# Patient Record
Sex: MALE | Race: WHITE | NOT HISPANIC OR LATINO | Employment: OTHER | URBAN - METROPOLITAN AREA
[De-identification: names, ages, dates, MRNs, and addresses within clinical notes are randomized per-mention and may not be internally consistent; named-entity substitution may affect disease eponyms.]

---

## 2018-02-05 ENCOUNTER — OFFICE VISIT (OUTPATIENT)
Dept: FAMILY MEDICINE | Facility: CLINIC | Age: 83
End: 2018-02-05
Payer: COMMERCIAL

## 2018-02-05 VITALS
DIASTOLIC BLOOD PRESSURE: 80 MMHG | TEMPERATURE: 96.7 F | SYSTOLIC BLOOD PRESSURE: 158 MMHG | BODY MASS INDEX: 19.93 KG/M2 | HEIGHT: 66 IN | OXYGEN SATURATION: 98 % | WEIGHT: 124 LBS | HEART RATE: 81 BPM

## 2018-02-05 DIAGNOSIS — M81.0 OSTEOPOROSIS, UNSPECIFIED OSTEOPOROSIS TYPE, UNSPECIFIED PATHOLOGICAL FRACTURE PRESENCE: ICD-10-CM

## 2018-02-05 DIAGNOSIS — Z00.00 ROUTINE GENERAL MEDICAL EXAMINATION AT A HEALTH CARE FACILITY: Primary | ICD-10-CM

## 2018-02-05 LAB
ANION GAP SERPL CALCULATED.3IONS-SCNC: 8 MMOL/L (ref 3–14)
BASOPHILS # BLD AUTO: 0 10E9/L (ref 0–0.2)
BASOPHILS NFR BLD AUTO: 0.3 %
BUN SERPL-MCNC: 21 MG/DL (ref 7–30)
CALCIUM SERPL-MCNC: 9.2 MG/DL (ref 8.5–10.1)
CHLORIDE SERPL-SCNC: 106 MMOL/L (ref 94–109)
CHOLEST SERPL-MCNC: 127 MG/DL
CO2 SERPL-SCNC: 25 MMOL/L (ref 20–32)
CREAT SERPL-MCNC: 0.84 MG/DL (ref 0.66–1.25)
DIFFERENTIAL METHOD BLD: ABNORMAL
EOSINOPHIL # BLD AUTO: 0.1 10E9/L (ref 0–0.7)
EOSINOPHIL NFR BLD AUTO: 2 %
ERYTHROCYTE [DISTWIDTH] IN BLOOD BY AUTOMATED COUNT: 13.1 % (ref 10–15)
GFR SERPL CREATININE-BSD FRML MDRD: 88 ML/MIN/1.7M2
GLUCOSE SERPL-MCNC: 86 MG/DL (ref 70–99)
HCT VFR BLD AUTO: 42.3 % (ref 40–53)
HDLC SERPL-MCNC: 49 MG/DL
HGB BLD-MCNC: 13.7 G/DL (ref 13.3–17.7)
LDLC SERPL CALC-MCNC: 66 MG/DL
LYMPHOCYTES # BLD AUTO: 1 10E9/L (ref 0.8–5.3)
LYMPHOCYTES NFR BLD AUTO: 14.9 %
MCH RBC QN AUTO: 31.3 PG (ref 26.5–33)
MCHC RBC AUTO-ENTMCNC: 32.4 G/DL (ref 31.5–36.5)
MCV RBC AUTO: 97 FL (ref 78–100)
MONOCYTES # BLD AUTO: 0.9 10E9/L (ref 0–1.3)
MONOCYTES NFR BLD AUTO: 13.4 %
NEUTROPHILS # BLD AUTO: 4.6 10E9/L (ref 1.6–8.3)
NEUTROPHILS NFR BLD AUTO: 69.4 %
NONHDLC SERPL-MCNC: 78 MG/DL
PLATELET # BLD AUTO: 225 10E9/L (ref 150–450)
POTASSIUM SERPL-SCNC: 4.3 MMOL/L (ref 3.4–5.3)
PTH-INTACT SERPL-MCNC: 75 PG/ML (ref 12–72)
RBC # BLD AUTO: 4.38 10E12/L (ref 4.4–5.9)
SODIUM SERPL-SCNC: 139 MMOL/L (ref 133–144)
TRIGL SERPL-MCNC: 60 MG/DL
WBC # BLD AUTO: 6.6 10E9/L (ref 4–11)

## 2018-02-05 PROCEDURE — G0439 PPPS, SUBSEQ VISIT: HCPCS | Performed by: FAMILY MEDICINE

## 2018-02-05 PROCEDURE — 80061 LIPID PANEL: CPT | Performed by: FAMILY MEDICINE

## 2018-02-05 PROCEDURE — 99213 OFFICE O/P EST LOW 20 MIN: CPT | Mod: 25 | Performed by: FAMILY MEDICINE

## 2018-02-05 PROCEDURE — 80048 BASIC METABOLIC PNL TOTAL CA: CPT | Performed by: FAMILY MEDICINE

## 2018-02-05 PROCEDURE — 82306 VITAMIN D 25 HYDROXY: CPT | Performed by: FAMILY MEDICINE

## 2018-02-05 PROCEDURE — 83970 ASSAY OF PARATHORMONE: CPT | Performed by: FAMILY MEDICINE

## 2018-02-05 PROCEDURE — 36415 COLL VENOUS BLD VENIPUNCTURE: CPT | Performed by: FAMILY MEDICINE

## 2018-02-05 PROCEDURE — 85025 COMPLETE CBC W/AUTO DIFF WBC: CPT | Performed by: FAMILY MEDICINE

## 2018-02-05 ASSESSMENT — ACTIVITIES OF DAILY LIVING (ADL)
CURRENT_FUNCTION: PREPARING MEALS REQUIRES ASSISTANCE
I_NEED_ASSISTANCE_FOR_THE_FOLLOWING_DAILY_ACTIVITIES:: PREPARING MEALS
I_NEED_ASSISTANCE_FOR_THE_FOLLOWING_DAILY_ACTIVITIES:: SHOPPING
CURRENT_FUNCTION: SHOPPING REQUIRES ASSISTANCE

## 2018-02-05 NOTE — PROGRESS NOTES
"SUBJECTIVE:   Kemar Jamil is a 82 year old male who presents for Preventive Visit.    Are you in the first 12 months of your Medicare coverage?  No    Physical   Annual:     Getting at least 3 servings of Calcium per day::  Yes    Bi-annual eye exam::  Yes    Dental care twice a year::  Yes    Sleep apnea or symptoms of sleep apnea::  None    Diet::  Regular (no restrictions)    Frequency of exercise::  1 day/week    Taking medications regularly::  Not Applicable    Additional concerns today::  No    Ability to successfully perform activities of daily living: shopping requires assistance and preparing meals requires assistance  Home Safety:  Throw rugs in the hallway and lack of grab bars in the bathroom  Hearing Impairment: difficulty following a conversation in a noisy restaurant or crowded room, difficulty following dialogue in the theater, need to ask people to speak up or repeat themselves, find that men's voices are easier to understand than woman's, difficulty understanding soft or whispered speech and difficulty understanding speech on the telephone    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 No signs of recurrence.    Hearing loss - wears hearing aides.    Osteoporosis -   Evaluation and treatment:    He was previously on Fosamax and he stopped it due to \"side effects\" but he can't recall what those were.   We will check PTH, Vitamin D, DEXA and go from there.    Elevated BP - does not check at home.  Evaluation and treatment:    No medications at this time.   I asked him to use his wife's BP machine and check bid for 2 weeks and send those to me.    BP Readings from Last 6 Encounters:   02/05/18 (!) 168/93   02/25/14 132/70   06/19/13 131/81   06/12/13 143/74   05/15/13 133/80   05/08/13 134/86     Preventive - advised to inquire about the new shingles vaccine at our pharmacy.    Immunization History   Administered Date(s) Administered     Influenza Vaccine IM 3yrs+ 4 Valent IIV4 10/09/2017     Pneumo " Conj 13-V (2010&after) 05/17/2015     Pneumococcal 23 valent 11/27/2007, 04/20/2015     TD (ADULT, 7+) 01/03/2003     TDAP Vaccine (Boostrix) 04/24/2013     Zoster vaccine, live 11/27/2007     Fall risk:  Fallen 2 or more times in the past year?: No  Any fall with injury in the past year?: No        Reviewed and updated as needed this visit by clinical staff         Reviewed and updated as needed this visit by Provider        Social History   Substance Use Topics     Smoking status: Never Smoker     Smokeless tobacco: Never Used      Comment: Nonsmoking household     Alcohol use Yes      Comment: occ       Alcohol Use 2/5/2018   If you drink alcohol, do you typically have greater than 3 drinks per day OR greater than 7 drinks per week?   No   No flowsheet data found.          Today's PHQ-2 Score:   PHQ-2 ( 1999 Pfizer) 2/5/2018   Q1: Little interest or pleasure in doing things 0   Q2: Feeling down, depressed or hopeless 0   PHQ-2 Score 0   Q1: Little interest or pleasure in doing things Not at all   Q2: Feeling down, depressed or hopeless Not at all   PHQ-2 Score 0       Do you feel safe in your environment - Yes      Current providers sharing in care for this patient include:   Patient Care Team:  Armando Correa Jr., MD as PCP - General    The following health maintenance items are reviewed in Epic and correct as of today:  Health Maintenance   Topic Date Due     FALL RISK ASSESSMENT  04/24/2014     ADVANCE DIRECTIVE PLANNING Q5 YRS  01/30/2023     TETANUS IMMUNIZATION (SYSTEM ASSIGNED)  04/24/2023     INFLUENZA VACCINE (SYSTEM ASSIGNED)  Completed     PNEUMOCOCCAL  Completed         Review of Systems  C: NEGATIVE for fever, chills, change in weight  I: NEGATIVE for worrisome rashes, moles or lesions  E: NEGATIVE for vision changes or irritation  E/M: NEGATIVE for ear, mouth and throat problems  R: NEGATIVE for significant cough or SOB  B: NEGATIVE for masses, tenderness or discharge  CV: NEGATIVE for chest  "pain, palpitations or peripheral edema  GI: NEGATIVE for nausea, abdominal pain, heartburn, or change in bowel habits  : NEGATIVE for frequency, dysuria, or hematuria  M: NEGATIVE for significant arthralgias or myalgia  N: NEGATIVE for weakness, dizziness or paresthesias  E: NEGATIVE for temperature intolerance, skin/hair changes  H: NEGATIVE for bleeding problems  P: NEGATIVE for changes in mood or affect    OBJECTIVE:   BP (!) 168/93 (Cuff Size: Adult Regular)  Pulse 81  Temp 96.7  F (35.9  C) (Oral)  Ht 5' 5.5\" (1.664 m)  Wt 124 lb (56.2 kg)  SpO2 98%  BMI 20.32 kg/m2 Estimated body mass index is 20.32 kg/(m^2) as calculated from the following:    Height as of this encounter: 5' 5.5\" (1.664 m).    Weight as of this encounter: 124 lb (56.2 kg).  Physical Exam  GENERAL: healthy, alert and no distress  EYES: Eyes grossly normal to inspection, PERRL and conjunctivae and sclerae normal  HENT: ear canals and TM's normal, nose and mouth without ulcers or lesions  NECK: no adenopathy, no asymmetry, masses, or scars and thyroid normal to palpation  RESP: lungs clear to auscultation - no rales, rhonchi or wheezes  CV: regular rate and rhythm, normal S1 S2, no S3 or S4, no murmur, click or rub, no peripheral edema and peripheral pulses strong  ABDOMEN: soft, nontender, no hepatosplenomegaly, no masses and bowel sounds normal   (male): normal male genitalia without lesions or urethral discharge, no hernia  MS: no gross musculoskeletal defects noted, no edema  SKIN: no suspicious lesions or rashes  NEURO: Normal strength and tone, mentation intact and speech normal  PSYCH: mentation appears normal, affect normal/bright    ASSESSMENT / PLAN:       End of Life Planning:  Patient currently has an advanced directive: No.  I have verified the patient's ablity to prepare an advanced directive/make health care decisions.  Literature was provided to assist patient in preparing an advanced directive.    COUNSELING:  Reviewed " "preventive health counseling, as reflected in patient instructions       Regular exercise       Healthy diet/nutrition      Estimated body mass index is 21.46 kg/(m^2) as calculated from the following:    Height as of 2/25/14: 5' 7\" (1.702 m).    Weight as of 2/25/14: 137 lb (62.1 kg).     reports that he has never smoked. He has never used smokeless tobacco.      Appropriate preventive services were discussed with this patient, including applicable screening as appropriate for cardiovascular disease, diabetes, osteopenia/osteoporosis, and glaucoma.  As appropriate for age/gender, discussed screening for colorectal cancer, prostate cancer, breast cancer, and cervical cancer. Checklist reviewing preventive services available has been given to the patient.    Reviewed patients plan of care and provided an AVS. The Basic Care Plan (routine screening as documented in Health Maintenance) for Kemar meets the Care Plan requirement. This Care Plan has been established and reviewed with the Patient.    Assessment and Plan - Decision Making    1. Routine general medical examination at a health care facility    Results of today's exam given to patient verbally along with age appropriate preventive measures. Written instructions reviewed and handed to the patient.    - CBC with platelets differential  - Basic metabolic panel  - Lipid panel reflex to direct LDL Fasting    2. Osteoporosis, unspecified osteoporosis type, unspecified pathological fracture presence  Per HPI  - Parathyroid Hormone Intact  - Vitamin D Deficiency  - DX Hip/Pelvis/Spine; Future      Written instructions given as follows:    Patient Instructions   1. Use your wife's blood pressure machine and check 2 times per day for 2 weeks and call in those results to me.  2. Set up bone density test - [phone].    3. I will contact you about your result along with further instructions.  4. Stop by our pharmacy and inquire about the new Shingles vaccine.      "

## 2018-02-05 NOTE — MR AVS SNAPSHOT
After Visit Summary   2/5/2018    Kemar Jamil    MRN: [id number]           Patient Information     Date Of Birth          1935        Visit Information        Provider Department      2/5/2018 9:30 AM Noe Andrew MD [facility]        Today's Diagnoses     Routine general medical examination at a health care facility    -  1    Osteoporosis, unspecified osteoporosis type, unspecified pathological fracture presence          Care Instructions    1. Use your wife's blood pressure machine and check 2 times per day for 2 weeks and call in those results to me.  2. Set up bone density test - [phone].    3. I will contact you about your result along with further instructions.  4. Stop by our pharmacy and inquire about the new Shingles vaccine.          Follow-ups after your visit        Future tests that were ordered for you today     Open Future Orders        Priority Expected Expires Ordered    DX Hip/Pelvis/Spine Routine  2/5/2019 2/5/2018            Who to contact     If you have questions or need follow up information about today's clinic visit or your schedule please contact [facility] directly at [phone].  Normal or non-critical lab and imaging results will be communicated to you by [email or web address], letter or phone within 4 business days after the clinic has received the results. If you do not hear from us within 7 days, please contact the clinic through [email or web address] or phone. If you have a critical or abnormal lab result, we will notify you by phone as soon as possible.  Submit refill requests through [employer] or call your pharmacy and they will forward the refill request to us. Please allow 3 business days for your refill to be completed.          Additional Information About Your Visit        [email or web address] Information     [employer] lets you send messages to your doctor, view your test results, renew your prescriptions, schedule appointments and more. To sign up, go to  [email or web address] . Click on \"Log in\" on the left side of the screen, which will take you to the Welcome page. Then click on \"Sign up Now\" on the right side of the page.     You will be asked to enter the access code listed below, as well as some personal information. Please follow the directions to create your username and password.     Your access code is: [email or web address]  Expires: 2018 10:00 AM     Your access code will  in 90 days. If you need help or a new code, please call your [place] clinic or [phone].        Care EveryWhere ID     This is your Care EveryWhere ID. This could be used by other organizations to access your [facility] medical records  [id number]        Your Vitals Were     Pulse Temperature Height Pulse Oximetry BMI (Body Mass Index)       81 96.7  F (35.9  C) (Oral) 5' 5.5\" (1.664 m) 98% 20.32 kg/m2        Blood Pressure from Last 3 Encounters:   18 158/80   14 132/70   13 131/81    Weight from Last 3 Encounters:   18 124 lb (56.2 kg)   14 137 lb (62.1 kg)   13 139 lb 12.8 oz (63.4 kg)              We Performed the Following     Basic metabolic panel     CBC with platelets differential     Lipid panel reflex to direct LDL Fasting     Parathyroid Hormone Intact     Vitamin D Deficiency        Primary Care Provider Office Phone # Fax #    Armando Correa Jr., [phone] [address]  [place] [address]        Equal Access to Services     [facility] : Hadii roderick Gutiérrez, wasivliada thalia, qaybta sampsonalcuba snow. So [facility] [phone].    ATENCIÓN: Si habla español, tiene a bolaños disposición servicios gratuitos de asistencia lingüística. Fadi al [phone].    We comply with applicable federal civil rights laws and [place] laws. We do not discriminate on the basis of race, color, national origin, age, disability, sex, sexual orientation, or gender " identity.            Thank you!     Thank you for choosing [facility] [place]  [facility]. Our goal is always to provide you with excellent care. Hearing back from our patients is one way we can continue to improve our services. Please take a few minutes to complete the written survey that you may receive in the mail after your visit with us. Thank you!             Your Updated Medication List - Protect others around you: Learn how to safely use, store and throw away your medicines at www.disposemymeds.org.          This list is accurate as of 2/5/18 10:00 AM.  Always use your most recent med list.                   Brand Name Dispense Instructions for use Diagnosis    ADONAY-MAG ZINC II PO      1 tablet ever other day        Calcium-Vitamin D 600-200 MG-UNIT Tabs      1 TABLET DAILY        fish oil-omega-3 fatty acids 1000 MG capsule      Take 2 g by mouth daily.        GLUCOSAMINE CHONDR 1500 COMPLX PO      1 daily        Lysine 500 MG Caps      1 daily        VITAMIN C PO      1daily

## 2018-02-05 NOTE — PATIENT INSTRUCTIONS
1. Use your wife's blood pressure machine and check 2 times per day for 2 weeks and call in those results to me.  2. Set up bone density test - [phone].    3. I will contact you about your result along with further instructions.  4. Stop by our pharmacy and inquire about the new Shingles vaccine.

## 2018-02-05 NOTE — NURSING NOTE
"Chief Complaint   Patient presents with     Physical       Initial BP (!) 168/93 (Cuff Size: Adult Regular)  Pulse 81  Temp 96.7  F (35.9  C) (Oral)  Ht 5' 5.5\" (1.664 m)  Wt 124 lb (56.2 kg)  SpO2 98%  BMI 20.32 kg/m2 Estimated body mass index is 20.32 kg/(m^2) as calculated from the following:    Height as of this encounter: 5' 5.5\" (1.664 m).    Weight as of this encounter: 124 lb (56.2 kg).  Medication Reconciliation: complete    Gabriela Payton LPN    "

## 2018-02-06 ENCOUNTER — TELEPHONE (OUTPATIENT)
Dept: FAMILY MEDICINE | Facility: CLINIC | Age: 83
End: 2018-02-06

## 2018-02-06 LAB — DEPRECATED CALCIDIOL+CALCIFEROL SERPL-MC: 23 UG/L (ref 20–75)

## 2018-02-07 ENCOUNTER — RADIANT APPOINTMENT (OUTPATIENT)
Dept: BONE DENSITY | Facility: CLINIC | Age: 83
End: 2018-02-07
Attending: FAMILY MEDICINE
Payer: COMMERCIAL

## 2018-02-07 DIAGNOSIS — M81.0 OSTEOPOROSIS, UNSPECIFIED OSTEOPOROSIS TYPE, UNSPECIFIED PATHOLOGICAL FRACTURE PRESENCE: ICD-10-CM

## 2018-02-07 PROCEDURE — 77085 DXA BONE DENSITY AXL VRT FX: CPT | Performed by: INTERNAL MEDICINE

## 2018-02-07 NOTE — PROGRESS NOTES
We will have him take Vitamin D 2000 units daily and see if the PTH will be suppressed. Recheck in 2 months.    All other labs are fine.    Noe Andrew M.D.

## 2018-02-07 NOTE — TELEPHONE ENCOUNTER
Pt requests information be given to his wife.  Wife notified of provider message as written.  Conchita Baxter RN

## 2018-02-07 NOTE — TELEPHONE ENCOUNTER
Please call him:     1. The PTH (parathyroid hormone was a bit high.) Your vitamin D is on the low end of normal. Please take over the counter Vitamin D 3, 2000 units per day.     2. Make a lab appointment in 2 months to recheck the vitamin D and PTH.     3. I will contact you again once I see the DEXA result as well.    Noe Andrwe M.D.

## 2018-02-13 ENCOUNTER — TELEPHONE (OUTPATIENT)
Dept: FAMILY MEDICINE | Facility: CLINIC | Age: 83
End: 2018-02-13

## 2018-02-13 DIAGNOSIS — M81.0 OSTEOPOROSIS, UNSPECIFIED OSTEOPOROSIS TYPE, UNSPECIFIED PATHOLOGICAL FRACTURE PRESENCE: Primary | ICD-10-CM

## 2018-02-13 NOTE — TELEPHONE ENCOUNTER
Patient gave verbal ok to speak with wife.  Wife informed of provider note as below.  Wife verbalized understanding.    Daysi SAVAGEN, RN, CPN

## 2018-02-19 ENCOUNTER — TELEPHONE (OUTPATIENT)
Dept: FAMILY MEDICINE | Facility: CLINIC | Age: 83
End: 2018-02-19

## 2018-02-19 DIAGNOSIS — M81.0 OSTEOPOROSIS, UNSPECIFIED OSTEOPOROSIS TYPE, UNSPECIFIED PATHOLOGICAL FRACTURE PRESENCE: Primary | ICD-10-CM

## 2018-02-19 NOTE — TELEPHONE ENCOUNTER
Twice a day readings at different times over last two weeks.  List starts from date two weeks ago and ends with yesterday.    117/65    131/74  123/70     125/70  134/69   134/63  129/72   121/57  129/70    149/70  144/69    113/62  119/61    130/70  130/66    98/46  124/59    125/67  129/66    117/63  121/56    155/47  126/70   125/80  126/74   128/73  135/57   123/70       Conchita Baxter RN

## 2018-02-19 NOTE — TELEPHONE ENCOUNTER
Pt wife notified of provider message as written.  Pt wife verbalized good understanding.  Wife asking about lab appointment due in 2 months.  Lab appointment made for before endocrinology appointment.  Conchita Baxter RN

## 2018-02-19 NOTE — TELEPHONE ENCOUNTER
Spouse calling on behalf of patient. They want to report Erling's BP readings from the past 2 weeks.

## 2018-04-04 ENCOUNTER — OFFICE VISIT (OUTPATIENT)
Dept: ENDOCRINOLOGY | Facility: CLINIC | Age: 83
End: 2018-04-04
Attending: FAMILY MEDICINE
Payer: COMMERCIAL

## 2018-04-04 VITALS
BODY MASS INDEX: 20.57 KG/M2 | SYSTOLIC BLOOD PRESSURE: 122 MMHG | WEIGHT: 128 LBS | DIASTOLIC BLOOD PRESSURE: 75 MMHG | HEIGHT: 66 IN | HEART RATE: 56 BPM

## 2018-04-04 DIAGNOSIS — M81.0 AGE-RELATED OSTEOPOROSIS WITHOUT CURRENT PATHOLOGICAL FRACTURE: Primary | ICD-10-CM

## 2018-04-04 DIAGNOSIS — M81.0 OSTEOPOROSIS, UNSPECIFIED OSTEOPOROSIS TYPE, UNSPECIFIED PATHOLOGICAL FRACTURE PRESENCE: ICD-10-CM

## 2018-04-04 LAB — PTH-INTACT SERPL-MCNC: 46 PG/ML (ref 18–80)

## 2018-04-04 PROCEDURE — 99204 OFFICE O/P NEW MOD 45 MIN: CPT | Performed by: INTERNAL MEDICINE

## 2018-04-04 PROCEDURE — 83970 ASSAY OF PARATHORMONE: CPT | Performed by: FAMILY MEDICINE

## 2018-04-04 PROCEDURE — 36415 COLL VENOUS BLD VENIPUNCTURE: CPT | Performed by: FAMILY MEDICINE

## 2018-04-04 PROCEDURE — 82306 VITAMIN D 25 HYDROXY: CPT | Performed by: FAMILY MEDICINE

## 2018-04-04 RX ORDER — ALENDRONATE SODIUM 70 MG/1
TABLET ORAL
Qty: 12 TABLET | Refills: 3 | Status: SHIPPED | OUTPATIENT
Start: 2018-04-04 | End: 2019-03-07

## 2018-04-04 NOTE — NURSING NOTE
"Chief Complaint   Patient presents with     Consult     Osteoporosis       Initial /75 (BP Location: Right arm, Cuff Size: Adult Regular)  Pulse 56  Ht 5' 6\" (1.676 m)  Wt 128 lb (58.1 kg)  BMI 20.66 kg/m2 Estimated body mass index is 20.66 kg/(m^2) as calculated from the following:    Height as of this encounter: 5' 6\" (1.676 m).    Weight as of this encounter: 128 lb (58.1 kg).  Medication Reconciliation: complete         Letty Contreras      "

## 2018-04-04 NOTE — PROGRESS NOTES
Name: Kemar Jamil is a 82 year old man, seen at the request of Dr. Noe Andrew for evaluation of osteoporosis     Chief Complaint   Patient presents with     Consult     Osteoporosis     HPI:  Recent issues:  Here with his wife for his osteoporosis evaluation.        Patient recalls diagnosis of osteoporosis approx age 72  Has had several DEXA scans in the past, including '06, '07, '09, '11, '12, 2/2018  Took Fosamax weekly (for 2 yrs?) until 2014, though details not available  He wonders if he may have taken another osteoporosis med, such as Actonel  Available records indicate scans:  4/14/06 DEXA:    L2-4 T -2.3   Left femur neck T -2.3  Scans 4/2007- 3/2012:   L2-4 T scores range T -1.5 to -2.3   Left hip femur T scores range -2.3 to -2.5  2/7/18 DEXA:   L1-3 T -2.2, marked degen changes L4   Left femur neck T -2.6 and right fem neck T -2.5  Previous bone fractures:  Leg age 5, also CHI and skull fracture age 17  No history of kidney stones or chronic steroid med use     Sees Dr. MONALISA Andrew for general medicine evaluations.    PMH/PSH:  Past Medical History:   Diagnosis Date     Decreased hearing      Macular degeneration     legally blind     Osteoporosis      Skull fracture (H)     ORIF     Past Surgical History:   Procedure Laterality Date     ENT SURGERY  5-8-13    Removal- Left Pinna     ENT SURGERY  6-12-13    Re-Excision- Left Pinna     EYE SURGERY      cataract     HC TOOTH EXTRACTION W/FORCEP       TONSILLECTOMY         Family Hx:  Family History   Problem Relation Age of Onset     Asthma Sister      CANCER Mother      uterine     DIABETES Mother      C.A.D. Maternal Grandfather      HEART DISEASE Father      MIs, CHF     CANCER Brother      esophageal         Social Hx:  Social History     Social History     Marital status:      Spouse name: N/A     Number of children: N/A     Years of education: N/A     Occupational History     Not on file.     Social History Main Topics      "Smoking status: Never Smoker     Smokeless tobacco: Never Used      Comment: Nonsmoking household     Alcohol use Yes      Comment: occ     Drug use: No     Sexual activity: Yes     Partners: Female     Other Topics Concern     Parent/Sibling W/ Cabg, Mi Or Angioplasty Before 65f 55m? No     Social History Narrative          MEDICATIONS:  has a current medication list which includes the following prescription(s): alendronate, fish oil-omega-3 fatty acids, ascorbic acid, calcium-vitamin d, lysine, multiple minerals-vitamins, and glucosamine-chondroit-vit c-mn.    ROS:     ROS: 10 point ROS neg other than the symptoms noted above in the HPI.    GENERAL: some fatigue; no weight changes; denies fevers, chills, night sweats.   HEENT: poor vision (MD), reduced hearing; no dysphagia, odonophagia, diplopia, neck pain  THYROID:  no apparent hyper or hypothyroid symptoms  CV: no chest pain, pressure, palpitations  LUNGS: no SOB, L[place], cough, wheezing   ABDOMEN: occasional heartburn; no diarrhea, constipation, abdominal pain  EXTREMITIES: no rashes, ulcers, edema  NEUROLOGY: no headaches, denies changes in vision, tingling, extremitiy numbness   MSK: mild leg weakness; denies back pain or arthralgias; no muscle aches or pains  SKIN: no rashes or lesions  : some nocturia  PSYCH:  stable mood, no significant anxiety or depression  ENDOCRINE: no heat or cold intolerance    Physical Exam   VS: /75 (BP Location: Right arm, Cuff Size: Adult Regular)  Pulse 56  Ht 5' 6\" (1.676 m)  Wt 128 lb (58.1 kg)  BMI 20.66 kg/m2  GENERAL: AXOX3, NAD, slender, well dressed, answering questions appropriately, appears stated age.  THYROID:  normal gland, no apparent nodules or goiter  HEENT: difficulty with hearing and vision, wearing hearing aids; neck non-tender, EOMI  CV: RRR, no rubs, gallops, no murmurs  LUNGS: CTAB, no wheezes, rales, or ronchi  ABDOMEN: soft, nontender, nondistended  EXTREMITIES: no pedal edema  NEUROLOGY: CN " grossly intact, + DTR lower extremity, no tremors  MSK: mildly kyphotic posture, back non-tender to palpation    LABS:    All pertinent notes, labs, and images personally reviewed by me.     A/P:  Encounter Diagnosis   Name Primary?     Age-related osteoporosis without current pathological fracture Yes       Comments:  Reviewed health history and osteoporosis issues.  He had limited treatment course of Fosamax in the past.    Plan:  Discussed general issues with the mild osteoporosis diagnosis and management  Reviewed concept of using the DEXA scan imaging to assess bone mineral density (BMD)  We reviewed treatment options with oral bisphosphonate, also the IV Boniva vs Reclast med options.    Recommend:  Advised restarting alendronate 70 mg po Q week   Discussed potential risks/benefits, dosing, Rx issues... He agreed.  New Rx sent to his [facility] pharmacy  Monitor for symptom changes  Plan to have the lab tests today, already ordered by Dr. Andrew  Continue calcium and vit D supplement use  Avoid heavy lifting and fall injuries  Hope for lower osteoporotic fracture risk, mild improvement in BMD, med tolerability  Recheck DEXA 4/2020 for comparison.  See back in 3-mo for f/u and discussion    Addressed patient questions today    Labs ordered today: No orders of the defined types were placed in this encounter.    Radiology/Consults ordered today: None    More than 50% of the time spent with Mr. Jamil on counseling / coordinating his care.  Total appointment time was 30 minutes.    Follow-up:  3 mo    Tyler Barrera MD  Endocrinology  [facility]  CC: MONALISA Andrew

## 2018-04-04 NOTE — LETTER
4/4/2018         RE: Kemar Jamil  [address] [facility] [address]        Dear Colleague,    Thank you for referring your patient, Kemar Jamil, to the [facility]. Please see a copy of my visit note below.    Name: Kemar Jamil is a 82 year old man, seen at the request of Dr. Noe Andrew for evaluation of osteoporosis     Chief Complaint   Patient presents with     Consult     Osteoporosis     HPI:  Recent issues:  Here with his wife for his osteoporosis evaluation.        Patient recalls diagnosis of osteoporosis approx age 72  Has had several DEXA scans in the past, including '06, '07, '09, '11, '12, 2/2018  Took Fosamax weekly (for 2 yrs?) until 2014, though details not available  He wonders if he may have taken another osteoporosis med, such as Actonel  Available records indicate scans:  4/14/06 DEXA:    L2-4 T -2.3   Left femur neck T -2.3  Scans 4/2007- 3/2012:   L2-4 T scores range T -1.5 to -2.3   Left hip femur T scores range -2.3 to -2.5  2/7/18 DEXA:   L1-3 T -2.2, marked degen changes L4   Left femur neck T -2.6 and right fem neck T -2.5  Previous bone fractures:  Leg age 5, also CHI and skull fracture age 17  No history of kidney stones or chronic steroid med use     Sees Dr. MONALISA Andrew for general medicine evaluations.    PMH/PSH:  Past Medical History:   Diagnosis Date     Decreased hearing      Macular degeneration     legally blind     Osteoporosis      Skull fracture (H)     ORIF     Past Surgical History:   Procedure Laterality Date     ENT SURGERY  5-8-13    Removal- Left Pinna     ENT SURGERY  6-12-13    Re-Excision- Left Pinna     EYE SURGERY      cataract     HC TOOTH EXTRACTION W/FORCEP       TONSILLECTOMY         Family Hx:  Family History   Problem Relation Age of Onset     Asthma Sister      CANCER Mother      uterine     DIABETES Mother      C.A.D. Maternal Grandfather      HEART DISEASE Father      MIs, CHF     CANCER Brother      " esophageal         Social Hx:  Social History     Social History     Marital status:      Spouse name: N/A     Number of children: N/A     Years of education: N/A     Occupational History     Not on file.     Social History Main Topics     Smoking status: Never Smoker     Smokeless tobacco: Never Used      Comment: Nonsmoking household     Alcohol use Yes      Comment: occ     Drug use: No     Sexual activity: Yes     Partners: Female     Other Topics Concern     Parent/Sibling W/ Cabg, Mi Or Angioplasty Before 65f 55m? No     Social History Narrative          MEDICATIONS:  has a current medication list which includes the following prescription(s): alendronate, fish oil-omega-3 fatty acids, ascorbic acid, calcium-vitamin d, lysine, multiple minerals-vitamins, and glucosamine-chondroit-vit c-mn.    ROS:     ROS: 10 point ROS neg other than the symptoms noted above in the HPI.    GENERAL: some fatigue; no weight changes; denies fevers, chills, night sweats.   HEENT: poor vision (MD), reduced hearing; no dysphagia, odonophagia, diplopia, neck pain  THYROID:  no apparent hyper or hypothyroid symptoms  CV: no chest pain, pressure, palpitations  LUNGS: no SOB, LAGU[place], cough, wheezing   ABDOMEN: occasional heartburn; no diarrhea, constipation, abdominal pain  EXTREMITIES: no rashes, ulcers, edema  NEUROLOGY: no headaches, denies changes in vision, tingling, extremitiy numbness   MSK: mild leg weakness; denies back pain or arthralgias; no muscle aches or pains  SKIN: no rashes or lesions  : some nocturia  PSYCH:  stable mood, no significant anxiety or depression  ENDOCRINE: no heat or cold intolerance    Physical Exam   VS: /75 (BP Location: Right arm, Cuff Size: Adult Regular)  Pulse 56  Ht 5' 6\" (1.676 m)  Wt 128 lb (58.1 kg)  BMI 20.66 kg/m2  GENERAL: AXOX3, NAD, slender, well dressed, answering questions appropriately, appears stated age.  THYROID:  normal gland, no apparent nodules or goiter  HEENT: " difficulty with hearing and vision, wearing hearing aids; neck non-tender, EOMI  CV: RRR, no rubs, gallops, no murmurs  LUNGS: CTAB, no wheezes, rales, or ronchi  ABDOMEN: soft, nontender, nondistended  EXTREMITIES: no pedal edema  NEUROLOGY: CN grossly intact, + DTR lower extremity, no tremors  MSK: mildly kyphotic posture, back non-tender to palpation    LABS:    All pertinent notes, labs, and images personally reviewed by me.     A/P:  Encounter Diagnosis   Name Primary?     Age-related osteoporosis without current pathological fracture Yes       Comments:  Reviewed health history and osteoporosis issues.  He had limited treatment course of Fosamax in the past.    Plan:  Discussed general issues with the mild osteoporosis diagnosis and management  Reviewed concept of using the DEXA scan imaging to assess bone mineral density (BMD)  We reviewed treatment options with oral bisphosphonate, also the IV Boniva vs Reclast med options.    Recommend:  Advised restarting alendronate 70 mg po Q week   Discussed potential risks/benefits, dosing, Rx issues... He agreed.  New Rx sent to his [facility] pharmacy  Monitor for symptom changes  Plan to have the lab tests today, already ordered by Dr. Andrew  Continue calcium and vit D supplement use  Avoid heavy lifting and fall injuries  Hope for lower osteoporotic fracture risk, mild improvement in BMD, med tolerability  Recheck DEXA 4/2020 for comparison.  See back in 3-mo for f/u and discussion    Addressed patient questions today    Labs ordered today: No orders of the defined types were placed in this encounter.    Radiology/Consults ordered today: None    More than 50% of the time spent with Mr. Jamil on counseling / coordinating his care.  Total appointment time was 30 minutes.    Follow-up:  3 mo    Tyler Barrera MD  Endocrinology  [facility]  CC: MONALISA Andrew      Again, thank you for allowing me to participate in the care of your patient.         Sincerely,        Tyler Barrera MD

## 2018-04-05 LAB — DEPRECATED CALCIDIOL+CALCIFEROL SERPL-MC: 29 UG/L (ref 20–75)

## 2018-04-17 ENCOUNTER — TELEPHONE (OUTPATIENT)
Dept: FAMILY MEDICINE | Facility: CLINIC | Age: 83
End: 2018-04-17

## 2018-04-17 NOTE — TELEPHONE ENCOUNTER
Patient asked me to speak with his wife regarding these questions. She states her  is very worried about this new diagnosis of osteoporosis and the concerns that he needed to see a specialist. Wife says she was not with her  when he saw Dr Andrew and he is hard of hearing and she thinks he may not of heard all of discussion. She would like to speak with provider if possible to help explain the connection of the vitamin D and parathyroid . She would also like to know bone density results. Also is there anything else he can do to help this besides take the vitamin D. Xin Javed RN

## 2018-04-17 NOTE — TELEPHONE ENCOUNTER
Reason for Call:  Other Lab results    Detailed comments: Patient would like more information regarding lab results with an explanation of the meaning. Either by phone or a detailed letter.    Phone Number Patient can be reached at: Home number on file:     [phone]       Best Time: ASAP    Can we leave a detailed message on this number? YES    Call taken on 4/17/2018 at 4:13 PM by Dolly Xiong

## 2018-04-18 ENCOUNTER — TELEPHONE (OUTPATIENT)
Dept: FAMILY MEDICINE | Facility: CLINIC | Age: 83
End: 2018-04-18

## 2018-04-18 NOTE — TELEPHONE ENCOUNTER
Please call wife:     1. The dx of osteoporosis was made many years ago. I was merely following up on it by ordering DEXA and other tests.     2. Low Vitamin D can cause elevation in PTH (Parathyroid Hormone).      3. Kemar's latest Vitamin D and PTH levels are normal.     4. Further questions are best addressed with the endocrinologist who saw Kemar on 4/4/18 and advised 3 month follow-up.    Noe Andrew M.D.

## 2018-04-18 NOTE — TELEPHONE ENCOUNTER
Pt wife notified of provider message as written.  Pt wife states the endocrinologist just asked pt a lot of questions and then said he could take the medicine.    Pt wife states her main concern is with pt's weight loss.  He used to be 190 lbs, and was 170 lbs for a long time.  He has consistently lost 5 pounds a year without trying to loss weight and is now 120 lbs.      Wife states pt has a good appetite and eats a lot.  He is active and mows the lawn, shovels and walks a lot.    Pt is healthy and does not get sick a lot.  He will not drink anything like an ensure.     Wife asking if his thyroid should be checked or what else should they be doing to find out why he is losing weight?    Conchita Baxter RN

## 2018-04-19 NOTE — TELEPHONE ENCOUNTER
Pt notified of provider message as written. He would like me to give wife message also.  She will be home after 4 pm today or before 7:30 am tomorrow.    Conchita Baxter RN

## 2018-04-19 NOTE — TELEPHONE ENCOUNTER
Please call patient's wife back:     1. We should evaluate the weight loss in clinic.   2. Please come with him to that clinic visit - we can check thyroid and other tests.    Noe Andrew M.D.      Wt Readings from Last 5 Encounters:   04/04/18 128 lb (58.1 kg)   02/05/18 124 lb (56.2 kg)   02/25/14 137 lb (62.1 kg)   06/19/13 139 lb 12.8 oz (63.4 kg)   06/12/13 142 lb 9.6 oz (64.7 kg)

## 2018-04-23 NOTE — PROGRESS NOTES
HPI:    Kemar is an 82 year old male here to discuss:    Weight loss - he is here on the insistence of his wife who is worried about his weight loss. But he is not worried about it.    First noticed: his wife tells me he has been losing weight over the last 20 years.  How much weight lost: his wife says he used to weigh 190#.  Intentional: no  Appetite/caloric intake: he and his wife both tell me that he eats well, has good appetite, not a picky eater.  Depression or stress: denies  Medications: nothing that would promote weight loss.  Intestinal issues: No abd pain, nausea, vomiting, diarrhea, blood in stool or black stools  Infectious: No fevers, chills or night sweats.  Neoplastic: Denies any lymph node enlargement. No skin or mucous membrane ulcers.  Inflammatory: Denies muscle pain, rashes, joint or bone pain.  Recreational chemicals: denies alcohol, tobacco, drugs.  Evaluation and treatment:    It seems his wife if concerned and not Kemar.   I looked back to 2009 and showed his wife that his weight was in the 130's at that time, then up to 140's and now upper 120's.   I considered the diff dx which can include inadequate calorie intake, malabsorption, medication effects, malignancy, metabolic.   We will start with CMP, CBC, ESR, TSH, U/A, SPEP and prealbumin and go from there.   I asked to see him again in 2 months for a weight check.    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 No signs of recurrence.    Hearing loss - wears hearing aides.    Osteoporosis and vit D deficiency -   Evaluation and treatment:   PTH was high but normalized after Vit D replacement - 2000 units daily.   Fosamax   Follows with endocrine    Elevated BP - does not check at home.  Evaluation and treatment:    No medications at this time.   I asked him  to use his wife's BP machine and check bid for 2 weeks and send those to me.    BP Readings from Last 6 Encounters:   04/25/18 133/75   04/04/18 122/75   02/05/18 158/80   02/25/14 132/70   06/19/13 131/81   06/12/13 143/74     Preventive - advised to inquire about the new shingles vaccine at our pharmacy but his wife tells me the cost was too much    Immunization History   Administered Date(s) Administered     Influenza Vaccine IM 3yrs+ 4 Valent IIV4 10/09/2017     Pneumo Conj 13-V (2010&after) 05/17/2015     Pneumococcal 23 valent 11/27/2007, 04/20/2015     TD (ADULT, 7+) 01/03/2003     TDAP Vaccine (Boostrix) 04/24/2013     Zoster vaccine, live 11/27/2007     ROS:    Const: No fevers or night sweats recently.  ENT: No runny nose, sore throat or ear pain.  Resp: No cough or shortness of breath.  CV: No chest pain, dizziness or cardiac palpitations.  GI: No nausea, vomiting, diarrhea or constipation. Denies blood in stools or black stools.  : No dysuria, frequency or hematuria.  The rest of the ROS negative, other than listed on HPI    Social History     Social History     Marital status:      Spouse name: N/A     Number of children: N/A     Years of education: N/A     Occupational History     Not on file.     Social History Main Topics     Smoking status: Never Smoker     Smokeless tobacco: Never Used      Comment: Nonsmoking household     Alcohol use Yes      Comment: occ     Drug use: No     Sexual activity: Yes     Partners: Female     Other Topics Concern     Parent/Sibling W/ Cabg, Mi Or Angioplasty Before 65f 55m? No     Social History Narrative       Assessment and Plan - Decision Making    1. Weight loss  Per HPI  - TSH with free T4 reflex  - Comprehensive metabolic panel  - CBC with platelets differential  - Erythrocyte sedimentation rate auto  - Protein electrophoresis  - UA reflex to Microscopic and Culture  - PSA, tumor marker  - Prealbumin      Written instructions given as  follows:    Patient Instructions   Let me see you in 2 months for weight check.

## 2018-04-25 ENCOUNTER — OFFICE VISIT (OUTPATIENT)
Dept: FAMILY MEDICINE | Facility: CLINIC | Age: 83
End: 2018-04-25
Payer: COMMERCIAL

## 2018-04-25 VITALS
RESPIRATION RATE: 14 BRPM | TEMPERATURE: 97 F | DIASTOLIC BLOOD PRESSURE: 75 MMHG | BODY MASS INDEX: 20.82 KG/M2 | WEIGHT: 129 LBS | OXYGEN SATURATION: 97 % | HEART RATE: 63 BPM | SYSTOLIC BLOOD PRESSURE: 133 MMHG

## 2018-04-25 DIAGNOSIS — R63.4 WEIGHT LOSS: Primary | ICD-10-CM

## 2018-04-25 LAB
ALBUMIN SERPL-MCNC: 4 G/DL (ref 3.4–5)
ALBUMIN UR-MCNC: NEGATIVE MG/DL
ALP SERPL-CCNC: 75 U/L (ref 40–150)
ALT SERPL W P-5'-P-CCNC: 26 U/L (ref 0–70)
ANION GAP SERPL CALCULATED.3IONS-SCNC: 6 MMOL/L (ref 3–14)
APPEARANCE UR: CLEAR
AST SERPL W P-5'-P-CCNC: 20 U/L (ref 0–45)
BASOPHILS # BLD AUTO: 0 10E9/L (ref 0–0.2)
BASOPHILS NFR BLD AUTO: 0.4 %
BILIRUB SERPL-MCNC: 0.4 MG/DL (ref 0.2–1.3)
BILIRUB UR QL STRIP: NEGATIVE
BUN SERPL-MCNC: 18 MG/DL (ref 7–30)
CALCIUM SERPL-MCNC: 9.1 MG/DL (ref 8.5–10.1)
CHLORIDE SERPL-SCNC: 107 MMOL/L (ref 94–109)
CO2 SERPL-SCNC: 29 MMOL/L (ref 20–32)
COLOR UR AUTO: YELLOW
CREAT SERPL-MCNC: 0.87 MG/DL (ref 0.66–1.25)
DIFFERENTIAL METHOD BLD: NORMAL
EOSINOPHIL # BLD AUTO: 0.2 10E9/L (ref 0–0.7)
EOSINOPHIL NFR BLD AUTO: 2.5 %
ERYTHROCYTE [DISTWIDTH] IN BLOOD BY AUTOMATED COUNT: 13.3 % (ref 10–15)
ERYTHROCYTE [SEDIMENTATION RATE] IN BLOOD BY WESTERGREN METHOD: 6 MM/H (ref 0–20)
GFR SERPL CREATININE-BSD FRML MDRD: 84 ML/MIN/1.7M2
GLUCOSE SERPL-MCNC: 70 MG/DL (ref 70–99)
GLUCOSE UR STRIP-MCNC: NEGATIVE MG/DL
HCT VFR BLD AUTO: 42.9 % (ref 40–53)
HGB BLD-MCNC: 14.1 G/DL (ref 13.3–17.7)
HGB UR QL STRIP: NEGATIVE
KETONES UR STRIP-MCNC: NEGATIVE MG/DL
LEUKOCYTE ESTERASE UR QL STRIP: NEGATIVE
LYMPHOCYTES # BLD AUTO: 1 10E9/L (ref 0.8–5.3)
LYMPHOCYTES NFR BLD AUTO: 13.7 %
MCH RBC QN AUTO: 31.6 PG (ref 26.5–33)
MCHC RBC AUTO-ENTMCNC: 32.9 G/DL (ref 31.5–36.5)
MCV RBC AUTO: 96 FL (ref 78–100)
MONOCYTES # BLD AUTO: 1 10E9/L (ref 0–1.3)
MONOCYTES NFR BLD AUTO: 13.2 %
NEUTROPHILS # BLD AUTO: 5.1 10E9/L (ref 1.6–8.3)
NEUTROPHILS NFR BLD AUTO: 70.2 %
NITRATE UR QL: NEGATIVE
PH UR STRIP: 5.5 PH (ref 5–7)
PLATELET # BLD AUTO: 240 10E9/L (ref 150–450)
POTASSIUM SERPL-SCNC: 4.6 MMOL/L (ref 3.4–5.3)
PROT SERPL-MCNC: 7.5 G/DL (ref 6.8–8.8)
RBC # BLD AUTO: 4.46 10E12/L (ref 4.4–5.9)
SODIUM SERPL-SCNC: 142 MMOL/L (ref 133–144)
SOURCE: NORMAL
SP GR UR STRIP: 1.01 (ref 1–1.03)
TSH SERPL DL<=0.005 MIU/L-ACNC: 3.82 MU/L (ref 0.4–4)
UROBILINOGEN UR STRIP-ACNC: 0.2 EU/DL (ref 0.2–1)
WBC # BLD AUTO: 7.3 10E9/L (ref 4–11)

## 2018-04-25 PROCEDURE — 85652 RBC SED RATE AUTOMATED: CPT | Performed by: FAMILY MEDICINE

## 2018-04-25 PROCEDURE — 00000402 ZZHCL STATISTIC TOTAL PROTEIN: Performed by: FAMILY MEDICINE

## 2018-04-25 PROCEDURE — 99214 OFFICE O/P EST MOD 30 MIN: CPT | Performed by: FAMILY MEDICINE

## 2018-04-25 PROCEDURE — G0103 PSA SCREENING: HCPCS | Performed by: FAMILY MEDICINE

## 2018-04-25 PROCEDURE — 36415 COLL VENOUS BLD VENIPUNCTURE: CPT | Performed by: FAMILY MEDICINE

## 2018-04-25 PROCEDURE — 84165 PROTEIN E-PHORESIS SERUM: CPT | Performed by: FAMILY MEDICINE

## 2018-04-25 PROCEDURE — 85025 COMPLETE CBC W/AUTO DIFF WBC: CPT | Performed by: FAMILY MEDICINE

## 2018-04-25 PROCEDURE — 81003 URINALYSIS AUTO W/O SCOPE: CPT | Performed by: FAMILY MEDICINE

## 2018-04-25 PROCEDURE — 84134 ASSAY OF PREALBUMIN: CPT | Performed by: FAMILY MEDICINE

## 2018-04-25 PROCEDURE — 84443 ASSAY THYROID STIM HORMONE: CPT | Performed by: FAMILY MEDICINE

## 2018-04-25 PROCEDURE — 80053 COMPREHEN METABOLIC PANEL: CPT | Performed by: FAMILY MEDICINE

## 2018-04-25 NOTE — MR AVS SNAPSHOT
After Visit Summary   4/25/2018    Kemar Jamil    MRN: [id number]           Patient Information     Date Of Birth          1935        Visit Information        Provider Department      4/25/2018 9:10 AM Noe Andrew MD [facility]        Today's Diagnoses     Weight loss    -  1      Care Instructions    Let me see you in 2 months for weight check.          Follow-ups after your visit        Your next 10 appointments already scheduled     Jul 25, 2018  8:00 AM CDT   Return Visit with Tyler Barrera MD   [facility] ([facility])    [address] [address] [phone]              Who to contact     If you have questions or need follow up information about today's clinic visit or your schedule please contact [facility] [place] directly at [phone].  Normal or non-critical lab and imaging results will be communicated to you by MyChart, letter or phone within 4 business days after the clinic has received the results. If you do not hear from us within 7 days, please contact the clinic through MyChart or phone. If you have a critical or abnormal lab result, we will notify you by phone as soon as possible.  Submit refill requests through [employer] or call your pharmacy and they will forward the refill request to us. Please allow 3 business days for your refill to be completed.          Additional Information About Your Visit        Care EveryWhere ID     This is your Care EveryWhere ID. This could be used by other organizations to access your [facility] medical records  [id number]        Your Vitals Were     Pulse Temperature Respirations Pulse Oximetry BMI (Body Mass Index)       63 97  F (36.1  C) (Oral) 14 97% 20.82 kg/m2        Blood Pressure from Last 3 Encounters:   04/25/18 133/75   04/04/18 122/75   02/05/18 158/80    Weight from Last 3 Encounters:   04/25/18 129 lb (58.5 kg)   04/04/18 128 lb (58.1 kg)    02/05/18 124 lb (56.2 kg)              We Performed the Following     CBC with platelets differential     Comprehensive metabolic panel     Erythrocyte sedimentation rate auto     Protein electrophoresis     PSA, tumor marker     TSH with free T4 reflex     UA reflex to Microscopic and Culture        Primary Care Provider Office Phone # Fax #    Noe Andrew [phone] [address] [facility] [address]        Equal Access to Services     [facility] : Hadii aad ku hadasho Soomaali, waaxda luqadaha, qaybta kaalmada adeegyada, waxay idiin hayaan adeeg kharash la'aan . So [facility] [phone].    ATENCIÓN: Si habla español, tiene a bolaños disposición servicios gratuitos de asistencia lingüística. Llame al [phone].    We comply with applicable federal civil rights laws and [place] laws. We do not discriminate on the basis of race, color, national origin, age, disability, sex, sexual orientation, or gender identity.            Thank you!     Thank you for choosing [facility]  for your care. Our goal is always to provide you with excellent care. Hearing back from our patients is one way we can continue to improve our services. Please take a few minutes to complete the written survey that you may receive in the mail after your visit with us. Thank you!             Your Updated Medication List - Protect others around you: Learn how to safely use, store and throw away your medicines at www.disposemymeds.org.          This list is accurate as of 4/25/18  9:53 AM.  Always use your most recent med list.                   Brand Name Dispense Instructions for use Diagnosis    alendronate 70 MG tablet    FOSAMAX    12 tablet    Take 1 tablet (70 mg) by mouth with 8oz water every 7 days 30 minutes before breakfast and remain upright during this time.    Age-related osteoporosis without current pathological fracture       Calcium-Vitamin D 600-200 MG-UNIT Tabs      1 TABLET DAILY         fish oil-omega-3 fatty acids 1000 MG capsule      Take 2 g by mouth daily.        GLUCOSAMINE CHONDR 1500 COMPLX PO      1 daily        Lysine 500 MG Caps      1 daily        VITAMIN C PO      1daily        VITAMIN D-3 PO      Take 2,000 mg by mouth

## 2018-04-25 NOTE — LETTER
May 1, 2018    Kemar Jamil  [address] [address] [address]        Dear Kemar,    The results of your recent tests were normal.  Below is a copy of the results.  It was a pleasure to see you at your last appointment.    If you have any questions or concerns, please call myself or my nurse at [phone].    Sincerely,     Noe Andrew MD/abisai      Results for orders placed or performed in visit on 04/25/18   TSH with free T4 reflex   Result Value Ref Range    TSH 3.82 0.40 - 4.00 mU/L   Comprehensive metabolic panel   Result Value Ref Range    Sodium 142 133 - 144 mmol/L    Potassium 4.6 3.4 - 5.3 mmol/L    Chloride 107 94 - 109 mmol/L    Carbon Dioxide 29 20 - 32 mmol/L    Anion Gap 6 3 - 14 mmol/L    Glucose 70 70 - 99 mg/dL    Urea Nitrogen 18 7 - 30 mg/dL    Creatinine 0.87 0.66 - 1.25 mg/dL    GFR Estimate 84 >60 mL/min/1.7m2    GFR Estimate If Black >90 >60 mL/min/1.7m2    Calcium 9.1 8.5 - 10.1 mg/dL    Bilirubin Total 0.4 0.2 - 1.3 mg/dL    Albumin 4.0 3.4 - 5.0 g/dL    Protein Total 7.5 6.8 - 8.8 g/dL    Alkaline Phosphatase 75 40 - 150 U/L    ALT 26 0 - 70 U/L    AST 20 0 - 45 U/L   CBC with platelets differential   Result Value Ref Range    WBC 7.3 4.0 - 11.0 10e9/L    RBC Count 4.46 4.4 - 5.9 10e12/L    Hemoglobin 14.1 13.3 - 17.7 g/dL    Hematocrit 42.9 40.0 - 53.0 %    MCV 96 78 - 100 fl    MCH 31.6 26.5 - 33.0 pg    MCHC 32.9 31.5 - 36.5 g/dL    RDW 13.3 10.0 - 15.0 %    Platelet Count 240 150 - 450 10e9/L    Diff Method Automated Method     % Neutrophils 70.2 %    % Lymphocytes 13.7 %    % Monocytes 13.2 %    % Eosinophils 2.5 %    % Basophils 0.4 %    Absolute Neutrophil 5.1 1.6 - 8.3 10e9/L    Absolute Lymphocytes 1.0 0.8 - 5.3 10e9/L    Absolute Monocytes 1.0 0.0 - 1.3 10e9/L    Absolute Eosinophils 0.2 0.0 - 0.7 10e9/L    Absolute Basophils 0.0 0.0 - 0.2 10e9/L   Erythrocyte sedimentation rate auto   Result Value Ref Range    Sed Rate 6 0 - 20 mm/h   Protein electrophoresis    Result Value Ref Range    Albumin Fraction 4.5 3.7 - 5.1 g/dL    Alpha 1 Fraction 0.3 0.2 - 0.4 g/dL    Alpha 2 Fraction 0.7 0.5 - 0.9 g/dL    Beta Fraction 0.8 0.6 - 1.0 g/dL    Gamma Fraction 1.0 0.7 - 1.6 g/dL    Monoclonal Peak 0.0 0.0 g/dL    ELP Interpretation:       Essentially normal electrophoretic pattern. No monoclonal protein seen. Pathologic   significance requires clinical correlation. SON Saldaña M.D., Ph.D., Pathologist.      UA reflex to Microscopic and Culture   Result Value Ref Range    Color Urine Yellow     Appearance Urine Clear     Glucose Urine Negative NEG^Negative mg/dL    Bilirubin Urine Negative NEG^Negative    Ketones Urine Negative NEG^Negative mg/dL    Specific Gravity Urine 1.015 1.003 - 1.035    Blood Urine Negative NEG^Negative    pH Urine 5.5 5.0 - 7.0 pH    Protein Albumin Urine Negative NEG^Negative mg/dL    Urobilinogen Urine 0.2 0.2 - 1.0 EU/dL    Nitrite Urine Negative NEG^Negative    Leukocyte Esterase Urine Negative NEG^Negative    Source Midstream Urine    PSA, tumor marker   Result Value Ref Range    PSA 0.22 0 - 4 ug/L   Prealbumin   Result Value Ref Range    Prealbumin 28 15 - 45 mg/dL

## 2018-04-25 NOTE — LETTER
April 30, 2018    Kemar Jamil  [address] [address] [facility] [address]        Dear Kemar,    The results of your recent tests were normal.  Below is a copy of the results.  It was a pleasure to see you at your last appointment.    If you have any questions or concerns, please call myself or my nurse at [phone].    Sincerely,     Noe Andrew MD/abisai      Results for orders placed or performed in visit on 04/25/18   TSH with free T4 reflex   Result Value Ref Range    TSH 3.82 0.40 - 4.00 mU/L   Comprehensive metabolic panel   Result Value Ref Range    Sodium 142 133 - 144 mmol/L    Potassium 4.6 3.4 - 5.3 mmol/L    Chloride 107 94 - 109 mmol/L    Carbon Dioxide 29 20 - 32 mmol/L    Anion Gap 6 3 - 14 mmol/L    Glucose 70 70 - 99 mg/dL    Urea Nitrogen 18 7 - 30 mg/dL    Creatinine 0.87 0.66 - 1.25 mg/dL    GFR Estimate 84 >60 mL/min/1.7m2    GFR Estimate If Black >90 >60 mL/min/1.7m2    Calcium 9.1 8.5 - 10.1 mg/dL    Bilirubin Total 0.4 0.2 - 1.3 mg/dL    Albumin 4.0 3.4 - 5.0 g/dL    Protein Total 7.5 6.8 - 8.8 g/dL    Alkaline Phosphatase 75 40 - 150 U/L    ALT 26 0 - 70 U/L    AST 20 0 - 45 U/L   CBC with platelets differential   Result Value Ref Range    WBC 7.3 4.0 - 11.0 10e9/L    RBC Count 4.46 4.4 - 5.9 10e12/L    Hemoglobin 14.1 13.3 - 17.7 g/dL    Hematocrit 42.9 40.0 - 53.0 %    MCV 96 78 - 100 fl    MCH 31.6 26.5 - 33.0 pg    MCHC 32.9 31.5 - 36.5 g/dL    RDW 13.3 10.0 - 15.0 %    Platelet Count 240 150 - 450 10e9/L    Diff Method Automated Method     % Neutrophils 70.2 %    % Lymphocytes 13.7 %    % Monocytes 13.2 %    % Eosinophils 2.5 %    % Basophils 0.4 %    Absolute Neutrophil 5.1 1.6 - 8.3 10e9/L    Absolute Lymphocytes 1.0 0.8 - 5.3 10e9/L    Absolute Monocytes 1.0 0.0 - 1.3 10e9/L    Absolute Eosinophils 0.2 0.0 - 0.7 10e9/L    Absolute Basophils 0.0 0.0 - 0.2 10e9/L   Erythrocyte sedimentation rate auto   Result Value Ref Range    Sed Rate 6 0 - 20 mm/h   Protein  electrophoresis   Result Value Ref Range    Albumin Fraction 4.5 3.7 - 5.1 g/dL    Alpha 1 Fraction 0.3 0.2 - 0.4 g/dL    Alpha 2 Fraction 0.7 0.5 - 0.9 g/dL    Beta Fraction 0.8 0.6 - 1.0 g/dL    Gamma Fraction 1.0 0.7 - 1.6 g/dL    Monoclonal Peak 0.0 0.0 g/dL    ELP Interpretation:       Essentially normal electrophoretic pattern. No monoclonal protein seen. Pathologic   significance requires clinical correlation. SON Saldaña M.D., Ph.D., Pathologist.      UA reflex to Microscopic and Culture   Result Value Ref Range    Color Urine Yellow     Appearance Urine Clear     Glucose Urine Negative NEG^Negative mg/dL    Bilirubin Urine Negative NEG^Negative    Ketones Urine Negative NEG^Negative mg/dL    Specific Gravity Urine 1.015 1.003 - 1.035    Blood Urine Negative NEG^Negative    pH Urine 5.5 5.0 - 7.0 pH    Protein Albumin Urine Negative NEG^Negative mg/dL    Urobilinogen Urine 0.2 0.2 - 1.0 EU/dL    Nitrite Urine Negative NEG^Negative    Leukocyte Esterase Urine Negative NEG^Negative    Source Midstream Urine    PSA, tumor marker   Result Value Ref Range    PSA 0.22 0 - 4 ug/L   Prealbumin   Result Value Ref Range    Prealbumin 28 15 - 45 mg/dL

## 2018-04-25 NOTE — NURSING NOTE
"Chief Complaint   Patient presents with     Weight Check       Initial /75 (Cuff Size: Adult Small)  Pulse 63  Temp 97  F (36.1  C) (Oral)  Resp 14  Wt 129 lb (58.5 kg)  SpO2 97%  BMI 20.82 kg/m2 Estimated body mass index is 20.82 kg/(m^2) as calculated from the following:    Height as of 4/4/18: 5' 6\" (1.676 m).    Weight as of this encounter: 129 lb (58.5 kg).      Gabriela Payton LPN    "

## 2018-04-26 LAB
ALBUMIN SERPL ELPH-MCNC: 4.5 G/DL (ref 3.7–5.1)
ALPHA1 GLOB SERPL ELPH-MCNC: 0.3 G/DL (ref 0.2–0.4)
ALPHA2 GLOB SERPL ELPH-MCNC: 0.7 G/DL (ref 0.5–0.9)
B-GLOBULIN SERPL ELPH-MCNC: 0.8 G/DL (ref 0.6–1)
GAMMA GLOB SERPL ELPH-MCNC: 1 G/DL (ref 0.7–1.6)
M PROTEIN SERPL ELPH-MCNC: 0 G/DL
PROT PATTERN SERPL ELPH-IMP: NORMAL
PSA SERPL-MCNC: 0.22 UG/L (ref 0–4)

## 2018-04-29 LAB — PREALB SERPL IA-MCNC: 28 MG/DL (ref 15–45)

## 2018-07-25 ENCOUNTER — OFFICE VISIT (OUTPATIENT)
Dept: ENDOCRINOLOGY | Facility: CLINIC | Age: 83
End: 2018-07-25
Payer: COMMERCIAL

## 2018-07-25 VITALS
SYSTOLIC BLOOD PRESSURE: 135 MMHG | HEART RATE: 58 BPM | HEIGHT: 66 IN | WEIGHT: 126 LBS | BODY MASS INDEX: 20.25 KG/M2 | DIASTOLIC BLOOD PRESSURE: 75 MMHG

## 2018-07-25 DIAGNOSIS — M81.0 OSTEOPOROSIS, UNSPECIFIED OSTEOPOROSIS TYPE, UNSPECIFIED PATHOLOGICAL FRACTURE PRESENCE: Primary | ICD-10-CM

## 2018-07-25 PROCEDURE — 99213 OFFICE O/P EST LOW 20 MIN: CPT | Performed by: INTERNAL MEDICINE

## 2018-07-25 NOTE — MR AVS SNAPSHOT
"              After Visit Summary   7/25/2018    Kemar Jamil    MRN: [id number]           Patient Information     Date Of Birth          1935        Visit Information        Provider Department      7/25/2018 8:00 AM Tyler Barrera MD [facility]        Today's Diagnoses     Osteoporosis, unspecified osteoporosis type, unspecified pathological fracture presence    -  1       Follow-ups after your visit        Your next 10 appointments already scheduled     Jun 19, 2019  8:00 AM CDT   Return Visit with Tyler Barrera MD   [facility] ([facility])    [address] [id number] [phone]              Who to contact     If you have questions or need follow up information about today's clinic visit or your schedule please contact [facility] directly at [phone].  Normal or non-critical lab and imaging results will be communicated to you by MyChart, letter or phone within 4 business days after the clinic has received the results. If you do not hear from us within 7 days, please contact the clinic through MyChart or phone. If you have a critical or abnormal lab result, we will notify you by phone as soon as possible.  Submit refill requests through [employer] or call your pharmacy and they will forward the refill request to us. Please allow 3 business days for your refill to be completed.          Additional Information About Your Visit        Care EveryWhere ID     This is your Care EveryWhere ID. This could be used by other organizations to access your [place] medical records  [id number]        Your Vitals Were     Pulse Height BMI (Body Mass Index)             58 1.676 m (5' 6\") 20.34 kg/m2          Blood Pressure from Last 3 Encounters:   07/25/18 135/75   04/25/18 133/75   04/04/18 122/75    Weight from Last 3 Encounters:   07/25/18 57.2 kg (126 lb)   04/25/18 58.5 kg (129 lb)   04/04/18 58.1 kg (128 lb)            "   Today, you had the following     No orders found for display       Primary Care Provider Office Phone # Fax #    Noe Andrew [phone] [address] [facility] [address]        Equal Access to Services     LESIA GUAN : Renea roderick blank ankusho Soomaali, waaxda luqadaha, qaybta kaalmada adejimenezyada, cuba maryin hayaakirby sandrajimenez kruse shanel younger. So [facility] [phone].    ATENCIÓN: Si habla español, tiene a bolaños disposición servicios gratuitos de asistencia lingüística. Llame al [phone].    We comply with applicable federal civil rights laws and [place] laws. We do not discriminate on the basis of race, color, national origin, age, disability, sex, sexual orientation, or gender identity.            Thank you!     Thank you for choosing [facility]  for your care. Our goal is always to provide you with excellent care. Hearing back from our patients is one way we can continue to improve our services. Please take a few minutes to complete the written survey that you may receive in the mail after your visit with us. Thank you!             Your Updated Medication List - Protect others around you: Learn how to safely use, store and throw away your medicines at www.disposemymeds.org.          This list is accurate as of 7/25/18  8:31 AM.  Always use your most recent med list.                   Brand Name Dispense Instructions for use Diagnosis    alendronate 70 MG tablet    FOSAMAX    12 tablet    Take 1 tablet (70 mg) by mouth with 8oz water every 7 days 30 minutes before breakfast and remain upright during this time.    Age-related osteoporosis without current pathological fracture       Calcium-Vitamin D 600-200 MG-UNIT Tabs      1 TABLET DAILY        fish oil-omega-3 fatty acids 1000 MG capsule      Take 2 g by mouth daily.        GLUCOSAMINE CHONDR 1500 COMPLX PO      1 daily        Lysine 500 MG Caps      1 daily        VITAMIN C PO      1daily        VITAMIN D-3 PO      Take  2,000 mg by mouth

## 2018-07-25 NOTE — PROGRESS NOTES
Name: Kemar Jamil    Chief Complaint   Patient presents with     Consult     Age-related osteoporosis without current pathological fracture     HPI:  Recent issues:  Here for f/u osteoporosis evaluation, with female family member today  He restarted alendronate medication 4/2018, tolerating well without issues.        Patient recalls diagnosis of osteoporosis approx age 72  Has had several DEXA scans in the past, including '06, '07, '09, '11, '12, 2/2018  Took Fosamax weekly (for 2 yrs?) until 2014, though details not available  He wonders if he may have taken another osteoporosis med, such as Actonel  Available records indicate scans:  4/14/06 DEXA:    L2-4 T -2.3   Left femur neck T -2.3  Scans 4/2007- 3/2012:   L2-4 T scores range T -1.5 to -2.3   Left hip femur T scores range -2.3 to -2.5  2/7/18 DEXA:   L1-3 T -2.2, marked degen changes L4   Left femur neck T -2.6 and right fem neck T -2.5  Previous bone fractures:  Leg age 5, also CHI and skull fracture age 17  No history of kidney stones or chronic steroid med use   Previous [facility] labs include:  Lab Results   Component Value Date     04/25/2018    POTASSIUM 4.6 04/25/2018    CHLORIDE 107 04/25/2018    CO2 29 04/25/2018    ANIONGAP 6 04/25/2018    GLC 70 04/25/2018    BUN 18 04/25/2018    CR 0.87 04/25/2018    GFRESTIMATED 84 04/25/2018    GFRESTBLACK >90 04/25/2018    ADONAY 9.1 04/25/2018    TSH 3.82 04/25/2018    VITDT 29 04/04/2018    PTHI 46 04/04/2018 4/2018. Started alendronate 70 mg weekly dose    Sees Dr. MONALISA Andrew for general medicine evaluations.    PMH/PSH:  Past Medical History:   Diagnosis Date     Decreased hearing      Macular degeneration     legally blind     Osteoporosis      Skull fracture (H)     ORIF     Past Surgical History:   Procedure Laterality Date     ENT SURGERY  5-8-13    Removal- Left Pinna     ENT SURGERY  6-12-13    Re-Excision- Left Pinna     EYE SURGERY      cataract     HC TOOTH EXTRACTION W/FORCEP        TONSILLECTOMY         Family Hx:  Family History   Problem Relation Age of Onset     Asthma Sister      Cancer Mother      uterine     Diabetes Mother      C.A.D. Maternal Grandfather      HEART DISEASE Father      MIs, CHF     Cancer Brother      esophageal         Social Hx:  Social History     Social History     Marital status:      Spouse name: N/A     Number of children: N/A     Years of education: N/A     Occupational History     Not on file.     Social History Main Topics     Smoking status: Never Smoker     Smokeless tobacco: Never Used      Comment: Nonsmoking household     Alcohol use Yes      Comment: occ     Drug use: No     Sexual activity: Yes     Partners: Female     Other Topics Concern     Parent/Sibling W/ Cabg, Mi Or Angioplasty Before 65f 55m? No     Social History Narrative          MEDICATIONS:  has a current medication list which includes the following prescription(s): alendronate, calcium-vitamin d, cholecalciferol, fish oil-omega-3 fatty acids, glucosamine-chondroit-vit c-mn, lysine, and ascorbic acid.    ROS:     ROS: 10 point ROS neg other than the symptoms noted above in the HPI.    GENERAL: some fatigue; no weight changes; denies fevers, chills, night sweats.   HEENT: poor vision (MD), reduced hearing; no dysphagia, odonophagia, diplopia, neck pain  THYROID:  no apparent hyper or hypothyroid symptoms  CV: no chest pain, pressure, palpitations  LUNGS: no SOB, [place], cough, wheezing   ABDOMEN: occasional heartburn; no diarrhea, constipation, abdominal pain  EXTREMITIES: no rashes, ulcers, edema  NEUROLOGY: no headaches, denies changes in vision, tingling, extremitiy numbness   MSK: occasional sore joints but not specific; mild leg weakness; denies back pain, no muscle aches or pains  SKIN: no rashes or lesions  : some nocturia  PSYCH:  stable mood, no significant anxiety or depression  ENDOCRINE: no heat or cold intolerance    Physical Exam   VS: /75 (BP Location: Right arm,  "Cuff Size: Adult Regular)  Pulse 58  Ht 1.676 m (5' 6\")  Wt 57.2 kg (126 lb)  BMI 20.34 kg/m2  GENERAL: AXOX3, NAD, slender, well dressed, answering questions appropriately, appears stated age.  THYROID:  normal gland, no apparent nodules or goiter  HEENT: difficulty with hearing and vision, wearing hearing aids; neck non-tender, EOMI  ABDOMEN: soft, nontender, nondistended  NEUROLOGY: CN grossly intact, no tremors  MSK: mildly kyphotic posture, back non-tender to palpation    LABS:    All pertinent notes, labs, and images personally reviewed by me.     A/P:  Encounter Diagnosis   Name Primary?     Osteoporosis, unspecified osteoporosis type, unspecified pathological fracture presence Yes       Comments:  Reviewed health history and osteoporosis issues.  I am pleased patient tolerating alendronate medication well.    Plan:  Discussed general issues with the mild osteoporosis diagnosis and management  Reviewed concept of using the DEXA scan imaging to assess bone mineral density (BMD)  We reviewed treatment options with oral bisphosphonate, also the IV Boniva vs Reclast med options.    Recommend:  Continue current treatment plan with alendronate 70 mg po Q week  Monitor for symptom changes  No lab tests ordered for today  Continue calcium and vit D supplement use  Avoid heavy lifting and fall injuries  Recheck DEXA 4/2020 for comparison.    Addressed patient questions today    Labs ordered today: No orders of the defined types were placed in this encounter.    Radiology/Consults ordered today: None    More than 50% of the time spent with Mr. Jamil on counseling / coordinating his care.  Total appointment time was 20 minutes.    Follow-up:  1 yr.    Tyler Barrera MD  Endocrinology  [facility]      "

## 2019-03-07 DIAGNOSIS — M81.0 AGE-RELATED OSTEOPOROSIS WITHOUT CURRENT PATHOLOGICAL FRACTURE: ICD-10-CM

## 2019-03-07 RX ORDER — ALENDRONATE SODIUM 70 MG/1
TABLET ORAL
Qty: 12 TABLET | Refills: 1 | Status: SHIPPED | OUTPATIENT
Start: 2019-03-07 | End: 2019-06-19

## 2019-03-07 NOTE — TELEPHONE ENCOUNTER
Filled per [facility] protocol. Returned call to number listed below.  Left detailed message Rx has been sent.      JANETTE ColesN, RN  [employer] [facility]

## 2019-03-07 NOTE — TELEPHONE ENCOUNTER
"Last Written Prescription Date:  4/04/18  Last Fill Quantity: 12 tablet,  # refills: 3   Last office visit: 7/25/2018 with prescribing provider:  Bruce   Future Office Visit:      Requested Prescriptions   Pending Prescriptions Disp Refills     alendronate (FOSAMAX) 70 MG tablet 12 tablet 3     Sig: Take 1 tablet (70 mg) by mouth with 8oz water every 7 days 30 minutes before breakfast and remain upright during this time.    Bisphosphonates Passed - 3/7/2019  8:49 AM       Passed - Recent (12 mo) or future (30 days) visit within the authorizing provider's specialty    Patient had office visit in the last 12 months or has a visit in the next 30 days with authorizing provider or within the authorizing provider's specialty.  See \"Patient Info\" tab in inbasket, or \"Choose Columns\" in Meds & Orders section of the refill encounter.             Passed - Dexa on file within past 2 years    Please review last Dexa result.          Passed - Medication is active on med list       Passed - Patient is age 18 or older       Passed - Normal serum creatinine on file within past 12 months    Recent Labs   Lab Test 04/25/18  0955  06/16/11   CR 0.87   < >  --    CRPOC  --   --  0.7    < > = values in this interval not displayed.               "

## 2019-03-07 NOTE — TELEPHONE ENCOUNTER
Reason for call:  Medication   If this is a refill request, has the caller requested the refill from the pharmacy already? Yes  Will the patient be using a [facility]? No  Name of the pharmacy and phone number for the current request: [facility] [place]   [phone]    Name of the medication requested: alendronate    Other request: Wife is calling. Patient is out of medication and is suppose to take this today. Please call. They did contact the pharmacy one month ago and then again Monday or Tuesday of this week.     Phone number to reach patient:  Home number on file [phone] (home)    Best Time:  any    Can we leave a detailed message on this number?  NO

## 2019-03-14 ENCOUNTER — OFFICE VISIT (OUTPATIENT)
Dept: FAMILY MEDICINE | Facility: CLINIC | Age: 84
End: 2019-03-14
Payer: COMMERCIAL

## 2019-03-14 ENCOUNTER — ANCILLARY PROCEDURE (OUTPATIENT)
Dept: GENERAL RADIOLOGY | Facility: CLINIC | Age: 84
End: 2019-03-14
Attending: FAMILY MEDICINE
Payer: COMMERCIAL

## 2019-03-14 VITALS
DIASTOLIC BLOOD PRESSURE: 80 MMHG | WEIGHT: 129.8 LBS | TEMPERATURE: 98 F | OXYGEN SATURATION: 97 % | BODY MASS INDEX: 20.95 KG/M2 | HEART RATE: 66 BPM | SYSTOLIC BLOOD PRESSURE: 144 MMHG

## 2019-03-14 DIAGNOSIS — S80.12XA CONTUSION OF LEFT TIBIA: ICD-10-CM
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
## 2019-03-14 DIAGNOSIS — H60.592 OTHER NONINFECTIVE ACUTE OTITIS EXTERNA OF LEFT EAR: ICD-10-CM

## 2019-03-14 DIAGNOSIS — S00.412A ABRASION OF LEFT EAR CANAL, INITIAL ENCOUNTER: ICD-10-CM
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
## 2019-03-14 PROCEDURE — 73590 X-RAY EXAM OF LOWER LEG: CPT | Mod: LT

## 2019-03-14 PROCEDURE — 99214 OFFICE O/P EST MOD 30 MIN: CPT | Performed by: FAMILY MEDICINE

## 2019-03-14 RX ORDER — NEOMYCIN SULFATE, POLYMYXIN B SULFATE AND HYDROCORTISONE 10; 3.5; 1 MG/ML; MG/ML; [USP'U]/ML
4 SUSPENSION/ DROPS AURICULAR (OTIC) 4 TIMES DAILY
Qty: 6 ML | Refills: 0 | Status: SHIPPED | OUTPATIENT
Start: 2019-03-14 | End: 2019-06-19

## 2019-03-14 ASSESSMENT — PAIN SCALES - GENERAL: PAINLEVEL: MODERATE PAIN (4)

## 2019-03-14 NOTE — PROGRESS NOTES
SUBJECTIVE:                                                    Kemar Jamil is a 83 year old male who presents to clinic today for the following health issues. He is accompanied by his wife.    Concern - injury to left foot and ankle from fall   He slipped on ice and impacted the left leg on March 4 while getting off a bus at [facility]. He noticed the increased swelling of the ankle and leg while rolling up his pants to clear water from his basement. He has noticed some focal tenderness on the proximal left tibia.   Onset: March 4    Description: Swelling in left leg, foot, and ankle    Intensity: moderate    Progression of Symptoms:  worsening    Accompanying Signs & Symptoms:  Pain of the proximal left leg at injury site. He has noticed swelling of the left ankle later on (with no associated pain of the ankle).    Previous history of similar problem:   none    Precipitating factors:   Worsened by: walking, stairs    Alleviating factors:  Improved by: rest for leg   Therapies Tried and outcome: none    Ear Problem  Duration of complaint: 3/10/19  Description:  Location: left ear  Pain: YES - mild  Redness: no  Discharge: YES - since Monday 3/11/19  Trauma/foreign body: Kemar also notes that the skin is tearing easily. The problem started when he was having a hearing aid mould casting done. Apparently, some skin was pulled off the ear canal when they were removing the mould.  Therapies tried and outcome: None     He wears hearing aids. 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    Past medical, family, and social histories, medications, and allergies are reviewed and updated in Epic.    ROS:  CONSTITUTIONAL: NEGATIVE for fever, chills, change in weight  ENT/MOUTH: POSITIVE for discharge from left ear  RESP: NEGATIVE for significant cough or SOB  CV: NEGATIVE for chest pain, palpitations or peripheral edema  MUSCULOSKELETAL: POSITIVE  for injury to left leg    This  document serves as a record of the services and decisions personally performed and made by Reginaldo Noriega MD and was created by Andi Roque, a trained medical scribe, based on personal observations and provider statements to the medical scribe.  March 14, 2019 11:23 AM   Andi Roque    OBJECTIVE:     /80 (BP Location: Right arm, Patient Position: Chair, Cuff Size: Adult Regular)   Pulse 66   Temp 98  F (36.7  C) (Oral)   Wt 58.9 kg (129 lb 12.8 oz)   SpO2 97%   BMI 20.95 kg/m    Body mass index is 20.95 kg/m .  GENERAL: healthy, alert and no distress  HENT: nose and mouth without ulcers or lesions. Left ear canal poorly visualized due to dried blood occupying most of the ear canal. Left TM not visualized. No pain with tragus depression or manipulation of the pinna.  NECK: no adenopathy, no asymmetry, masses, or scars and thyroid normal to palpation  RESP: lungs clear to auscultation - no rales, rhonchi or wheezes  CV: regular rate and rhythm, normal S1 S2, no S3 or S4, no murmur, click or rub, no peripheral edema and peripheral pulses strong  ABDOMEN: soft, nontender, no hepatosplenomegaly, no masses and bowel sounds normal  MS: no gross musculoskeletal defects noted, no edema. focal tenderness to proximal left tibia just distal to the pes anserine. No swelling nor ecchymosis noted there, but mild swelling of left ankle and foot (without tenderness).     A X-Ray of left tibia/fibula was ordered. My reading of this film is negative for fracture. (No comparison films available: pending review by Radiologist.)     ASSESSMENT/PLAN:     (W00.9XXA) Fall due to slipping on ice or snow, initial encounter  (primary encounter diagnosis)  (S80.12XA) Contusion of left tibia  Comment: No evidence for fracture.  Plan: XR Tibia & Fibula Left 2 Views        Follow up in 6 weeks from date of injury if leg pain and swelling are not resolving.    (S00.412A) Abrasion of left ear canal, initial encounter  (H60.592) Other  noninfective acute otitis externa of left ear  Comment: Infection from epithelial damage is suspected.  Plan: neomycin-polymyxin-hydrocortisone (CORTISPORIN)        [id number] otic suspension        Use Cortisporin for the next 1 week.        Follow up in 1 week if pain and drainage are not resolving.    The information in this document, created by the medical scribe for me, accurately reflects the services I personally performed and the decisions made by me. I have reviewed and approved this document for accuracy prior to leaving the patient care area.  March 14, 2019 11:58 AM  Reginaldo Noriega MD  [facility]

## 2019-03-14 NOTE — PATIENT INSTRUCTIONS
At [facility], we strive to deliver an exceptional experience to you, every time we see you.  If you receive a survey in the mail, please send us back your thoughts. We really do value your feedback.    Based on your medical history, these are the current health maintenance/preventive care services that you are due for (some may have been done at this visit.)  Health Maintenance Due   Topic Date Due     ZOSTER IMMUNIZATION (2 of 3) 01/22/2008     INFLUENZA VACCINE (1) 09/01/2018     MEDICARE ANNUAL WELLNESS VISIT  02/05/2019     FALL RISK ASSESSMENT  02/05/2019     PHQ-2 Q1 YR  02/05/2019         Suggested websites for health information:  Www.[facility].org : Up to date and easily searchable information on multiple topics.  Www.medlineplus.gov : medication info, interactive tutorials, watch real surgeries online  Www.familydoctor.org : good info from the Academy of Family Physicians  Www.cdc.gov : public health info, travel advisories, epidemics (H1N1)  Www.aap.org : children's health info, normal development, vaccinations  Www.health.[facility].[place].us : [place] dept of health, public health issues in [place], N1N1    Your care team:                            Family Medicine Internal Medicine   MD Michael Sandoval MD Shantel Branch-Fleming, MD Katya Georgiev PA-C Nam Ho, MD Pediatrics   LYDIA Menjivar, MD Mariah Carrillo CNP, MD Deborah Mielke, MD Kim Thein, APRN [facility]      Clinic hours: Monday - Thursday 7 am-7 pm; Fridays 7 am-5 pm.   Urgent care: Monday - Friday 11 am-9 pm; Saturday and Sunday 9 am-5 pm.  Pharmacy : Monday -Thursday 8 am-8 pm; Friday 8 am-6 pm; Saturday and Sunday 9 am-5 pm.     Clinic: [phone]   Pharmacy: [phone]

## 2019-04-03 ENCOUNTER — ANCILLARY PROCEDURE (OUTPATIENT)
Dept: GENERAL RADIOLOGY | Facility: CLINIC | Age: 84
End: 2019-04-03
Attending: FAMILY MEDICINE
Payer: COMMERCIAL

## 2019-04-03 ENCOUNTER — OFFICE VISIT (OUTPATIENT)
Dept: FAMILY MEDICINE | Facility: CLINIC | Age: 84
End: 2019-04-03
Payer: COMMERCIAL

## 2019-04-03 ENCOUNTER — ANCILLARY PROCEDURE (OUTPATIENT)
Dept: ULTRASOUND IMAGING | Facility: CLINIC | Age: 84
End: 2019-04-03
Attending: FAMILY MEDICINE
Payer: COMMERCIAL

## 2019-04-03 VITALS
DIASTOLIC BLOOD PRESSURE: 82 MMHG | HEART RATE: 79 BPM | HEIGHT: 66 IN | BODY MASS INDEX: 20.41 KG/M2 | SYSTOLIC BLOOD PRESSURE: 141 MMHG | WEIGHT: 127 LBS | OXYGEN SATURATION: 99 %

## 2019-04-03 DIAGNOSIS — M79.89 LEFT LEG SWELLING: ICD-10-CM

## 2019-04-03 DIAGNOSIS — S93.402D SPRAIN OF LEFT ANKLE, UNSPECIFIED LIGAMENT, SUBSEQUENT ENCOUNTER: ICD-10-CM

## 2019-04-03 DIAGNOSIS — M79.89 LEFT LEG SWELLING: Primary | ICD-10-CM

## 2019-04-03 PROCEDURE — 73610 X-RAY EXAM OF ANKLE: CPT | Mod: LT

## 2019-04-03 PROCEDURE — 93971 EXTREMITY STUDY: CPT | Mod: LT

## 2019-04-03 PROCEDURE — 99214 OFFICE O/P EST MOD 30 MIN: CPT | Performed by: FAMILY MEDICINE

## 2019-04-03 ASSESSMENT — MIFFLIN-ST. JEOR: SCORE: 1213.82

## 2019-04-03 NOTE — PROGRESS NOTES
HPI:    Kemar is an 83 year old male here to discuss:    Left leg swelling - slipped on ice and fell on the left leg 3/4/19 while getting off bus. He had pain and swelling on his left leg and ankle. The pain has almost resolved but the left leg swelling has remained. He feels pressures behind the left knee. His wife says there was bruising here. He is able to walk without difficultly. Denies any numbness or cold foot. Regarding DVT risk, he and his wife flew about 3-4 hours around 3/3/19. He denies cough or shortness of breath.  Evaluation and treatment:    Was seen in clinic on 3/14/19 - xray left tib-fib negative.   His wife is still worried we may have missed a fracture - left ankle xray negative today.   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   No signs of compartment syndrome.   I explained this should resolve with time - I have advised ice and elevation.   If symptoms continue, I would refer to ortho.    Previous weight loss - I saw him in 2018 - his wife who is worried about his weight loss. But he was not worried about it. Even though he did not follow-up, his weight has stabilized.  Evaluation and treatment:    It seems his wife was concerned and not Kemar.   I looked back to 2009 and showed his wife that his weight was in the 130's at that time, then up to 140's and now upper 120's.   I considered the diff dx which can include inadequate calorie intake, malabsorption, medication effects, malignancy, metabolic.   Previous CMP, CBC, ESR, TSH, U/A, SPEP and prealbumin all fine.   Even though he did not follow-up, his weight has stabilized.    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 No signs of recurrence.    Hearing loss - wears hearing aides.    Osteoporosis and vit D deficiency -   Evaluation and  treatment:   PTH was high but normalized after Vit D replacement - 2000 units daily.   Fosamax   Follows with endocrine    Elevated BP - does not check at home.  Evaluation and treatment:    No medications at this time.   I asked him to use his wife's BP machine and check bid for 2 weeks and send those to me.    BP Readings from Last 6 Encounters:   03/14/19 144/80   07/25/18 135/75   04/25/18 133/75   04/04/18 122/75   02/05/18 158/80   02/25/14 132/70     Preventive - advised to inquire about the new shingles vaccine at our pharmacy but his wife tells me the cost was too much    Immunization History   Administered Date(s) Administered     Influenza Vaccine IM 3yrs+ 4 Valent IIV4 10/09/2017     Pneumo Conj 13-V (2010&after) 05/17/2015     Pneumococcal 23 valent 11/27/2007, 04/20/2015     TD (ADULT, 7+) 01/03/2003     TDAP Vaccine (Boostrix) 04/24/2013     Zoster vaccine, live 11/27/2007     ROS:    Const: No fevers or night sweats recently.  ENT: No runny nose, sore throat or ear pain.  Resp: No cough or shortness of breath.  CV: No chest pain, dizziness or cardiac palpitations.  GI: No nausea, vomiting, diarrhea or constipation. Denies blood in stools or black stools.  : No dysuria, frequency or hematuria.  The rest of the ROS negative, other than listed on HPI    Social History     Socioeconomic History     Marital status:      Spouse name: Not on file     Number of children: Not on file     Years of education: Not on file     Highest education level: Not on file   Occupational History     Not on file   Social Needs     Financial resource strain: Not on file     Food insecurity:     Worry: Not on file     Inability: Not on file     Transportation needs:     Medical: Not on file     Non-medical: Not on file   Tobacco Use     Smoking status: Never Smoker     Smokeless tobacco: Never Used     Tobacco comment: Nonsmoking household   Substance and Sexual Activity     Alcohol use: Yes     Comment: occ     Drug  "use: No     Sexual activity: Yes     Partners: Female   Lifestyle     Physical activity:     Days per week: Not on file     Minutes per session: Not on file     Stress: Not on file   Relationships     Social connections:     Talks on phone: Not on file     Gets together: Not on file     Attends [employer] service: Not on file     Active member of club or organization: Not on file     Attends meetings of clubs or organizations: Not on file     Relationship status: Not on file     Intimate partner violence:     Fear of current or ex partner: Not on file     Emotionally abused: Not on file     Physically abused: Not on file     Forced sexual activity: Not on file   Other Topics Concern     Parent/sibling w/ CABG, MI or angioplasty before 65F 55M? No   Social History Narrative     Not on file     Exam:    /82   Pulse 79   Ht 1.676 m (5' 6\")   Wt 57.6 kg (127 lb)   SpO2 99%   BMI 20.50 kg/m      Gen: Elderly male in no acute distress.  Neck: No enlarged lymph nodes, thyromegally or other masses.  Lungs: Good air movement and otherwise clear.  CV: Heart RRR with no murmurs. No JVD, carotid bruits. There is 1+ edema from left ankle to left mid leg. Left DP pulse normal. Toes are warm with good capillary refill.  MS: left ankle shows swelling and tenderness over the lateral malleolus, mild. There is tenderness anterior to the lateral malleolus. This pain is worsened by inverting the foot while plantarflexed. No tenderness over the achilles, calcaneous, base of the 5th metatarsal, tarsal bones, metatarsals or toes. There is a fullness behind the left knee. Trace bruising, barely visible. Gait is normal.    Assessment and Plan - Decision Making    1. Left leg swelling    Per HPI    - US Lower Extremity Venous Duplex Left; Future    2. Sprain of left ankle, unspecified ligament, subsequent encounter    Per HPI    - XR Ankle Left G/E 3 Views      Written instructions given as follows:    Patient Instructions   I will " contact you with results along with further instructions.

## 2019-05-09 ENCOUNTER — OFFICE VISIT (OUTPATIENT)
Dept: FAMILY MEDICINE | Facility: CLINIC | Age: 84
End: 2019-05-09
Payer: COMMERCIAL

## 2019-05-09 VITALS
HEART RATE: 68 BPM | SYSTOLIC BLOOD PRESSURE: 142 MMHG | TEMPERATURE: 97.8 F | BODY MASS INDEX: 20.34 KG/M2 | WEIGHT: 126 LBS | DIASTOLIC BLOOD PRESSURE: 80 MMHG | OXYGEN SATURATION: 99 %

## 2019-05-09 DIAGNOSIS — Z00.00 ENCOUNTER FOR PREVENTIVE HEALTH EXAMINATION: Primary | ICD-10-CM

## 2019-05-09 LAB
ANION GAP SERPL CALCULATED.3IONS-SCNC: 6 MMOL/L (ref 3–14)
BUN SERPL-MCNC: 20 MG/DL (ref 7–30)
CALCIUM SERPL-MCNC: 9 MG/DL (ref 8.5–10.1)
CHLORIDE SERPL-SCNC: 104 MMOL/L (ref 94–109)
CHOLEST SERPL-MCNC: 141 MG/DL
CO2 SERPL-SCNC: 30 MMOL/L (ref 20–32)
CREAT SERPL-MCNC: 0.81 MG/DL (ref 0.66–1.25)
GFR SERPL CREATININE-BSD FRML MDRD: 82 ML/MIN/{1.73_M2}
GLUCOSE SERPL-MCNC: 91 MG/DL (ref 70–99)
HDLC SERPL-MCNC: 54 MG/DL
LDLC SERPL CALC-MCNC: 76 MG/DL
NONHDLC SERPL-MCNC: 87 MG/DL
POTASSIUM SERPL-SCNC: 4.3 MMOL/L (ref 3.4–5.3)
SODIUM SERPL-SCNC: 140 MMOL/L (ref 133–144)
TRIGL SERPL-MCNC: 53 MG/DL

## 2019-05-09 PROCEDURE — 36415 COLL VENOUS BLD VENIPUNCTURE: CPT | Performed by: FAMILY MEDICINE

## 2019-05-09 PROCEDURE — 80061 LIPID PANEL: CPT | Performed by: FAMILY MEDICINE

## 2019-05-09 PROCEDURE — 80048 BASIC METABOLIC PNL TOTAL CA: CPT | Performed by: FAMILY MEDICINE

## 2019-05-09 PROCEDURE — 99397 PER PM REEVAL EST PAT 65+ YR: CPT | Performed by: FAMILY MEDICINE

## 2019-05-09 NOTE — LETTER
May 10, 2019    Kemar Jamil  [id number] [facility] [id number]        Dear Kemar,    The results of your recent tests were normal.  Below is a copy of the results.  It was a pleasure to see you at your last appointment.    If you have any questions or concerns, please call myself or my nurse at [phone].    Sincerely,     Noe Andrew MD/abisai      Results for orders placed or performed in visit on 05/09/19   Lipid panel reflex to direct LDL Fasting   Result Value Ref Range    Cholesterol 141 <200 mg/dL    Triglycerides 53 <150 mg/dL    HDL Cholesterol 54 >39 mg/dL    LDL Cholesterol Calculated 76 <100 mg/dL    Non HDL Cholesterol 87 <130 mg/dL   Basic metabolic panel   Result Value Ref Range    Sodium 140 133 - 144 mmol/L    Potassium 4.3 3.4 - 5.3 mmol/L    Chloride 104 94 - 109 mmol/L    Carbon Dioxide 30 20 - 32 mmol/L    Anion Gap 6 3 - 14 mmol/L    Glucose 91 70 - 99 mg/dL    Urea Nitrogen 20 7 - 30 mg/dL    Creatinine 0.81 0.66 - 1.25 mg/dL    GFR Estimate 82 >60 mL/min/[1.73_m2]    GFR Estimate If Black >90 >60 mL/min/[1.73_m2]    Calcium 9.0 8.5 - 10.1 mg/dL

## 2019-05-09 NOTE — PATIENT INSTRUCTIONS
1. I recommend including veggies, fresh fruits (3 to 5 servings), nuts (unsalted) in your daily diet. Cut down on carbs like bread, pasta, rice, potatoes. Cut down on red meats like burgers etc. Increase healthy proteins like beans, tofu, tuna, chicken and turkey.    2. Get regular exercise like walking daily (150 minutes per week).      3. See the dentist and eye doctor regularly.     4. I will contact you about your results - if all is well, see you in one year for a physical with fasting labs.

## 2019-05-09 NOTE — PROGRESS NOTES
"  SUBJECTIVE:   Kemar Jamil is a 83 year old male who presents for Preventive Visit.    Are you in the first 12 months of your Medicare Part B coverage?  No    Physical Health:    In general, how would you rate your overall physical health? good    Outside of work, how many days during the week do you exercise? none-yard work, fitness room-no schedule    Outside of work, approximately how many minutes a day do you exercise?not applicable    If you drink alcohol do you typically have >3 drinks per day or >7 drinks per week? No    Do you usually eat at least 4 servings of fruit and vegetables a day, include whole grains & fiber and avoid regularly eating high fat or \"junk\" foods? Yes    Do you have any problems taking medications regularly?  No    Do you have any side effects from medications? none    Needs assistance for the following daily activities: transportation, shopping and money management    Which of the following safety concerns are present in your home?  none identified     Hearing impairment: Yes, hearing aides     In the past 6 months, have you been bothered by leaking of urine? no    Mental Health:    In general, how would you rate your overall mental or emotional health? excellent  PHQ-2 Score:      Do you feel safe in your environment? Yes    Do you have a Health Care Directive? Yes: Patient states has Advance Directive and will bring in a copy to clinic.    Fall risk:  Fallen 2 or more times in the past year?: No  Any fall with injury in the past year?: Yes    Cognitive Screenin) Repeat 3 items (Leader, Season, Table)    2) Clock draw: NORMAL  3) 3 item recall: Recalls 2 objects   Results: NORMAL clock, 1-2 items recalled: COGNITIVE IMPAIRMENT LESS LIKELY    Mini-CogTM Copyright SHERICE Nichols. Licensed by the author for use in [facility]; reprinted with permission (kirstie@.[facility]). All rights reserved.        Previous left leg swelling - slipped on ice and fell on the left leg 3/4/19 " while getting off bus. He had pain and swelling on his left leg and ankle. There was pressures behind the left knee. Regarding DVT risk, he and his wife flew about 3-4 hours around 3/3/19. He denied cough or shortness of breath.  Evaluation and treatment:    Was seen in clinic on 3/14/19 - xray left tib-fib negative.   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   No signs of compartment syndrome.   Symptoms have resolved.    Previous weight loss - I saw him in 2018 - his wife who is worried about his weight loss. But he was not worried about it. Even though he did not follow-up, his weight has stabilized.  Evaluation and treatment:    It seems his wife was concerned and not Erling.   I looked back to 2009 and showed his wife that his weight was in the 130's at that time, then up to 140's and now upper 120's.   I considered the diff dx which can include inadequate calorie intake, malabsorption, medication effects, malignancy, metabolic.   Previous CMP, CBC, ESR, TSH, U/A, SPEP and prealbumin all fine.   Even though he did not follow-up, his weight has stabilized.    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 No signs of recurrence.    Hearing loss - wears hearing aides.    Osteoporosis and vit D deficiency -   Evaluation and treatment:   PTH was high but normalized after Vit D replacement - 2000 units daily.   Fosamax   Follows with endocrine    Elevated BP - does not check at home.  Evaluation and treatment:    No medications at this time.   I previously asked him to use his wife's BP machine and check bid for 2 weeks and send those to me but he did not. I reminded him to do that.    BP Readings from Last 6 Encounters:   05/09/19 142/80   04/03/19 141/82   03/14/19 144/80   07/25/18 135/75   04/25/18 133/75    04/04/18 122/75     Preventive - advised to inquire about the new shingles vaccine at our pharmacy but the cost was too much    Immunization History   Administered Date(s) Administered     Influenza Vaccine IM 3yrs+ 4 Valent IIV4 10/09/2017     Pneumo Conj 13-V (2010&after) 05/17/2015     Pneumococcal 23 valent 11/27/2007, 04/20/2015     TD (ADULT, 7+) 01/03/2003     TDAP Vaccine (Boostrix) 04/24/2013     Zoster vaccine, live 11/27/2007     - Advanced Directive: referred today.    -lipids screen:     Recent Labs   Lab Test 05/09/19  1125 02/05/18  1006 02/29/12  0948   CHOL 141 127 135   HDL 54 49 36*   LDL 76 66 85   TRIG 53 60 71   CHOLHDLRATIO  --   --  3.8     Diabetes screen:     Last Comprehensive Metabolic Panel:  Sodium   Date Value Ref Range Status   05/09/2019 140 133 - 144 mmol/L Final     Potassium   Date Value Ref Range Status   05/09/2019 4.3 3.4 - 5.3 mmol/L Final     Chloride   Date Value Ref Range Status   05/09/2019 104 94 - 109 mmol/L Final     Carbon Dioxide   Date Value Ref Range Status   05/09/2019 30 20 - 32 mmol/L Final     Anion Gap   Date Value Ref Range Status   05/09/2019 6 3 - 14 mmol/L Final     Glucose   Date Value Ref Range Status   05/09/2019 91 70 - 99 mg/dL Final     Comment:     Fasting specimen     Urea Nitrogen   Date Value Ref Range Status   05/09/2019 20 7 - 30 mg/dL Final     Creatinine   Date Value Ref Range Status   05/09/2019 0.81 0.66 - 1.25 mg/dL Final     GFR Estimate   Date Value Ref Range Status   05/09/2019 82 >60 mL/min/[1.73_m2] Final     Comment:     Non  GFR Calc  Starting 12/18/2018, serum creatinine based estimated GFR (eGFR) will be   calculated using the Chronic Kidney Disease Epidemiology Collaboration   (CKD-EPI) equation.       Calcium   Date Value Ref Range Status   05/09/2019 9.0 8.5 - 10.1 mg/dL Final         SH:    Marital status:   Kids: one  Employment: retired  Exercise: walking, yardwork  Tobacco: no  Etoh: 1 every few  days  Recreational drugs: no  Caffeine: coffee one per day.      Reviewed and updated as needed this visit by clinical staff         Reviewed and updated as needed this visit by Provider        Social History     Tobacco Use     Smoking status: Never Smoker     Smokeless tobacco: Never Used     Tobacco comment: Nonsmoking household   Substance Use Topics     Alcohol use: Yes     Comment: occ                           Current providers sharing in care for this patient include:   Patient Care Team:  Noe Andrew MD as PCP - General (Family Practice)  Noe Andrew MD as Assigned PCP    The following health maintenance items are reviewed in Epic and correct as of today:  Health Maintenance   Topic Date Due     ZOSTER IMMUNIZATION (2 of 3) 01/22/2008     PHQ-2  01/01/2019     MEDICARE ANNUAL WELLNESS VISIT  02/05/2019     FALL RISK ASSESSMENT  02/05/2019     INFLUENZA VACCINE (Season Ended) 09/01/2019     ADVANCE DIRECTIVE PLANNING Q5 YRS  01/30/2023     DTAP/TDAP/TD IMMUNIZATION (3 - Td) 04/24/2023     IPV IMMUNIZATION  Aged Out     MENINGITIS IMMUNIZATION  Aged Out           ROS:  CONSTITUTIONAL: NEGATIVE for fever, chills, change in weight  INTEGUMENTARY/SKIN: NEGATIVE for worrisome rashes, moles or lesions  EYES: NEGATIVE for vision changes or irritation  ENT/MOUTH: NEGATIVE for ear, mouth and throat problems  RESP: NEGATIVE for significant cough or SOB  BREAST: NEGATIVE for masses, tenderness or discharge  CV: NEGATIVE for chest pain, palpitations or peripheral edema  GI: NEGATIVE for nausea, abdominal pain, heartburn, or change in bowel habits  : NEGATIVE for frequency, dysuria, or hematuria  MUSCULOSKELETAL: NEGATIVE for significant arthralgias or myalgia  NEURO: NEGATIVE for weakness, dizziness or paresthesias  ENDOCRINE: NEGATIVE for temperature intolerance, skin/hair changes  HEME: NEGATIVE for bleeding problems  PSYCHIATRIC: NEGATIVE for changes in mood or affect    OBJECTIVE:   /87 (Cuff Size:  "Adult Regular)   Pulse 68   Temp 97.8  F (36.6  C) (Oral)   Wt 57.2 kg (126 lb)   SpO2 99%   BMI 20.34 kg/m   Estimated body mass index is 20.34 kg/m  as calculated from the following:    Height as of 4/3/19: 1.676 m (5' 6\").    Weight as of this encounter: 57.2 kg (126 lb).  EXAM:   GENERAL: healthy, alert and no distress  EYES: Eyes grossly normal to inspection, PERRL and conjunctivae and sclerae normal  HENT: ear canals and TM's normal, nose and mouth without ulcers or lesions  NECK: no adenopathy, no asymmetry, masses, or scars and thyroid normal to palpation  RESP: lungs clear to auscultation - no rales, rhonchi or wheezes  CV: regular rate and rhythm, normal S1 S2, no S3 or S4, no murmur, click or rub, no peripheral edema and peripheral pulses strong  ABDOMEN: soft, nontender, no hepatosplenomegaly, no masses and bowel sounds normal  MS: no gross musculoskeletal defects noted, no edema  SKIN: no suspicious lesions or rashes  NEURO: Normal strength and tone, mentation intact and speech normal  PSYCH: mentation appears normal, affect normal/bright        ASSESSMENT / PLAN:       End of Life Planning:  Patient currently has an advanced directive: No.  I have verified the patient's ablity to prepare an advanced directive/make health care decisions.  Literature was provided to assist patient in preparing an advanced directive.    COUNSELING:  Reviewed preventive health counseling, as reflected in patient instructions       Regular exercise       Healthy diet/nutrition    BP Readings from Last 1 Encounters:   04/03/19 141/82     Estimated body mass index is 20.5 kg/m  as calculated from the following:    Height as of 4/3/19: 1.676 m (5' 6\").    Weight as of 4/3/19: 57.6 kg (127 lb).           reports that he has never smoked. He has never used smokeless tobacco.      Appropriate preventive services were discussed with this patient, including applicable screening as appropriate for cardiovascular disease, " diabetes, osteopenia/osteoporosis, and glaucoma.  As appropriate for age/gender, discussed screening for colorectal cancer, prostate cancer, breast cancer, and cervical cancer. Checklist reviewing preventive services available has been given to the patient.    Reviewed patients plan of care and provided an AVS. The Basic Care Plan (routine screening as documented in Health Maintenance) for Kemar meets the Care Plan requirement. This Care Plan has been established and reviewed with the Patient.    Assessment and Plan - Decision Making    1. Encounter for preventive health examination    Results of today's exam given to patient verbally along with age appropriate preventive measures. Written instructions reviewed and handed to the patient.    - Lipid panel reflex to direct LDL Fasting  - Basic metabolic panel      Written instructions given as follows:    Patient Instructions   1. I recommend including veggies, fresh fruits (3 to 5 servings), nuts (unsalted) in your daily diet. Cut down on carbs like bread, pasta, rice, potatoes. Cut down on red meats like burgers etc. Increase healthy proteins like beans, tofu, tuna, chicken and turkey.    2. Get regular exercise like walking daily (150 minutes per week).      3. See the dentist and eye doctor regularly.     4. I will contact you about your results - if all is well, see you in one year for a physical with fasting labs.

## 2019-06-19 ENCOUNTER — OFFICE VISIT (OUTPATIENT)
Dept: ENDOCRINOLOGY | Facility: CLINIC | Age: 84
End: 2019-06-19
Payer: COMMERCIAL

## 2019-06-19 VITALS
WEIGHT: 123 LBS | DIASTOLIC BLOOD PRESSURE: 73 MMHG | HEIGHT: 66 IN | HEART RATE: 62 BPM | BODY MASS INDEX: 19.77 KG/M2 | SYSTOLIC BLOOD PRESSURE: 121 MMHG

## 2019-06-19 DIAGNOSIS — M81.0 AGE-RELATED OSTEOPOROSIS WITHOUT CURRENT PATHOLOGICAL FRACTURE: ICD-10-CM

## 2019-06-19 DIAGNOSIS — M81.0 AGE-RELATED OSTEOPOROSIS WITHOUT CURRENT PATHOLOGICAL FRACTURE: Primary | ICD-10-CM

## 2019-06-19 PROCEDURE — 99213 OFFICE O/P EST LOW 20 MIN: CPT | Performed by: INTERNAL MEDICINE

## 2019-06-19 RX ORDER — ALENDRONATE SODIUM 70 MG/1
TABLET ORAL
Qty: 12 TABLET | Refills: 3 | Status: SHIPPED | OUTPATIENT
Start: 2019-06-19 | End: 2020-06-18

## 2019-06-19 ASSESSMENT — MIFFLIN-ST. JEOR: SCORE: 1195.67

## 2019-06-19 NOTE — PROGRESS NOTES
Name: Kemar Jamil    Chief Complaint   Patient presents with     Osteoporosis     HPI:  Recent issues:  Here for f/u osteoporosis evaluation, with female family member today  He restarted alendronate medication 4/2018, tolerating well without issues.        Patient recalls diagnosis of osteoporosis approx age 72  Has had several DEXA scans in the past, including '06, '07, '09, '11, '12, 2/2018  Took Fosamax weekly (for 2 yrs?) until 2014, though details not available  He wonders if he may have taken another osteoporosis med, such as Actonel  Available records indicate scans:  4/14/06 DEXA:    L2-4 T -2.3   Left femur neck T -2.3  Scans 4/2007- 3/2012:   L2-4 T scores range T -1.5 to -2.3   Left hip femur T scores range -2.3 to -2.5  2/7/18 DEXA:   L1-3 T -2.2, marked degen changes L4   Left femur neck T -2.6 and right fem neck T -2.5    Previous bone fractures:  Leg age 5, also CHI and skull fracture age 17  No history of kidney stones or chronic steroid med use   4/2018. Started alendronate 70 mg weekly dose  Takes calcium +D 600-200 1-tablet daily, also separate vitamin D 2000 U daily   Previous [facility] labs include:  Lab Results   Component Value Date     05/09/2019    POTASSIUM 4.3 05/09/2019    CHLORIDE 104 05/09/2019    CO2 30 05/09/2019    ANIONGAP 6 05/09/2019    GLC 91 05/09/2019    BUN 20 05/09/2019    CR 0.81 05/09/2019    GFRESTIMATED 82 05/09/2019    GFRESTBLACK >90 05/09/2019    ADONAY 9.0 05/09/2019    TSH 3.82 04/25/2018    VITDT 29 04/04/2018    PTHI 46 04/04/2018     Current dose:  Alendronate 70 mg po weekly dose    Lives in [facility]  Sees Dr. MONALISA Andrew for general medicine evaluations.    PMH/PSH:  Past Medical History:   Diagnosis Date     Decreased hearing      Macular degeneration     legally blind     Osteoporosis      Skull fracture (H)     ORIF     Past Surgical History:   Procedure Laterality Date     ENT SURGERY  5-8-13    Removal- Left Pinna     ENT SURGERY  6-12-13     Re-Excision- Left Pinna     EYE SURGERY      cataract     HC TOOTH EXTRACTION W/FORCEP       TONSILLECTOMY         Family Hx:  Family History   Problem Relation Age of Onset     Asthma Sister      Cancer Mother         uterine     Diabetes Mother      C.A.D. Maternal Grandfather      Heart Disease Father         MIs, CHF     Cancer Brother         esophageal         Social Hx:  Social History     Socioeconomic History     Marital status:      Spouse name: Not on file     Number of children: Not on file     Years of education: Not on file     Highest education level: Not on file   Occupational History     Not on file   Social Needs     Financial resource strain: Not on file     Food insecurity:     Worry: Not on file     Inability: Not on file     Transportation needs:     Medical: Not on file     Non-medical: Not on file   Tobacco Use     Smoking status: Never Smoker     Smokeless tobacco: Never Used     Tobacco comment: Nonsmoking household   Substance and Sexual Activity     Alcohol use: Yes     Comment: occ     Drug use: No     Sexual activity: Yes     Partners: Female   Lifestyle     Physical activity:     Days per week: Not on file     Minutes per session: Not on file     Stress: Not on file   Relationships     Social connections:     Talks on phone: Not on file     Gets together: Not on file     Attends [employer]ism service: Not on file     Active member of club or organization: Not on file     Attends meetings of clubs or organizations: Not on file     Relationship status: Not on file     Intimate partner violence:     Fear of current or ex partner: Not on file     Emotionally abused: Not on file     Physically abused: Not on file     Forced sexual activity: Not on file   Other Topics Concern     Parent/sibling w/ CABG, MI or angioplasty before 65F 55M? No   Social History Narrative     Not on file          MEDICATIONS:  has a current medication list which includes the following prescription(s):  "alendronate, calcium-vitamin d, cholecalciferol, fish oil-omega-3 fatty acids, glucosamine-chondroit-vit c-mn, lysine, and ascorbic acid.    ROS:     ROS: 10 point ROS neg other than the symptoms noted above in the HPI.    GENERAL: some fatigue; mild weight loss; denies fevers, chills, night sweats.   HEENT: poor vision (MD), reduced hearing; no dysphagia, odonophagia, diplopia, neck pain  THYROID:  no apparent hyper or hypothyroid symptoms  CV: no chest pain, pressure, palpitations  LUNGS: no SOB, L[place], cough, wheezing   ABDOMEN: occasional heartburn; no diarrhea, constipation, abdominal pain  EXTREMITIES: no rashes, ulcers, edema  NEUROLOGY: no headaches, denies changes in vision, tingling, extremitiy numbness   MSK: occasional sore joints but not specific; mild leg weakness; denies back pain, no muscle aches or pains  SKIN: no rashes or lesions  : some nocturia  PSYCH:  stable mood, no significant anxiety or depression  ENDOCRINE: no heat or cold intolerance    Physical Exam   VS: /73   Pulse 62   Ht 1.676 m (5' 6\")   Wt 55.8 kg (123 lb)   BMI 19.85 kg/m    GENERAL: AXOX3, NAD, slender, well dressed, answering questions appropriately, appears stated age.  THYROID:  normal gland, no apparent nodules or goiter  HEENT: difficulty with hearing and limited vision, wearing hearing aids; neck non-tender, EOMI  ABDOMEN: soft, nontender, nondistended  NEUROLOGY: CN grossly intact, no tremors  MSK: mildly kyphotic posture, back non-tender to palpation    LABS:    All pertinent notes, labs, and images personally reviewed by me.     A/P:  Encounter Diagnosis   Name Primary?     Age-related osteoporosis without current pathological fracture Yes       Comments:  Reviewed health history and osteoporosis issues.  Patient tolerating alendronate medication well.    Plan:  Discussed general issues with the mild osteoporosis diagnosis and management  Reviewed concept of using the DEXA scan imaging to assess bone mineral " density (BMD)  We reviewed treatment options with oral bisphosphonate, also the IV Boniva vs Reclast med options.  Discussed expectations of alendronate use to improve BMD and lower osteoporosis fracture risk    Recommend:  Continue current treatment plan with alendronate 70 mg po Q week  Monitor for symptom changes  No lab tests ordered for today  Continue calcium and vit D supplement use  Avoid heavy lifting and fall injuries  Recheck DEXA 4/2020 for comparison.    Addressed patient questions today    Patient Instructions   I will be reassigned from the [facility] to the [facility] starting in August 2019.  I will no longer be seeing patients in [place] after August 1, 2019.  My work schedule:  [facility] -Monday, Tuesday, Friday and [facility]- Wednesdays    A new endocrinologist (Dr. Starr) will be working at [facility] and [facility], starting in late July or early August 2019.  You may schedule future appointments with the new endocrinologist or travel to the [facility] or [facility] to see me for evaluations.   Appointment scheduling:  [facility]    [phone]  [facility]):  [phone]  [facility]   [phone]  [facility]): [phone]        Labs ordered today: No orders of the defined types were placed in this encounter.    Radiology/Consults ordered today: None    More than 50% of the time spent with Mr. Jamil on counseling / coordinating his care.  Total appointment time was 20 minutes.    Follow-up:  4/2020.    Tyler Barrera MD  Endocrinology  [facility]/[place]    Copy:  MONALISA Andrew

## 2019-06-19 NOTE — PATIENT INSTRUCTIONS
I will be reassigned from the [facility] to the [facility] starting in August 2019.  I will no longer be seeing patients in [place] after August 1, 2019.  My work schedule:  [facility] -Monday, Tuesday, Friday and [facility]- Wednesdays    A new endocrinologist (Dr. Starr) will be working at [facility] and [facility], starting in late July or early August 2019.  You may schedule future appointments with the new endocrinologist or travel to the [facility] or [facility] to see me for evaluations.   Appointment scheduling:  [facility]    [phone]  [facility]):  [phone]  [facility]   [phone]  [facility]): [phone]

## 2019-08-21 NOTE — TELEPHONE ENCOUNTER
Mailed lab results to home.Mitzi France MA/TC     tablet 3    losartan (COZAAR) 50 MG tablet Take 50 mg by mouth daily      gabapentin (NEURONTIN) 300 MG capsule Take 300 mg by mouth 3 times daily.  dronabinol (MARINOL) 5 MG capsule Take 5 mg by mouth 2 times daily (before meals).  mirtazapine (REMERON SOL-TAB) 15 MG disintegrating tablet Take 15 mg by mouth nightly      metoprolol tartrate (LOPRESSOR) 25 MG tablet Take 25 mg by mouth once       b complex vitamins capsule Take 1 capsule by mouth daily      senna-docusate (PERICOLACE) 8.6-50 MG per tablet Take 1 tablet by mouth as needed       UNABLE TO FIND Take by mouth every morning Over The Counter One Daily Men's  Multivitamin      Lactobacillus (PROBIOTIC ACIDOPHILUS PO) Take by mouth every morning Over The Counter           Allergies  Allergies   Allergen Reactions    Niacin And Related Hives    Furosemide        REVIEW OF SYSTEMS   Within above limitations. 14 point review of systems reviewed. Pertinent positive or negative as per HPI or otherwise negative per 14 point systems review. PHYSICAL EXAM     Wt Readings from Last 3 Encounters:   08/20/19 240 lb (108.9 kg)   07/29/19 193 lb 1.6 oz (87.6 kg)   06/19/19 204 lb 12.8 oz (92.9 kg)       Blood pressure (!) 77/54, pulse 50, temperature 98.6 °F (37 °C), temperature source Oral, resp. rate 14, height 5' 5\" (1.651 m), weight 240 lb (108.9 kg). General - AAO x 3  Psych - Appropriate affect/speech. No agitation  Eyes - Eye lids intact. No scleral icterus  ENT - Lips wnl. External ear clear/dry/intact. No thyromegaly on inspection  Neuro - No gross peripheral or central neuro deficits on inspection  Heart - Sinus. RRR. S1 and S2 present. No elevated JVD appreciated  Lung - Adequate air entry b/l, No crackles/wheezes appreciated  GI - Urostomy present. Soft. No guarding/rigidity. No hepatosplenomegaly/ascites. BS+    Skin - Intact. No rash/petechiae/ecchymosis.  Warm extremities    LABS AND IMAGING   CBC  [unfilled]    Last 3 Hemoglobin  Lab Results   Component Value Date    HGB 10.7 08/20/2019    HGB  07/23/2019     9.5  HGB DECREASE CALLED TO GALI FITZPATRICK RN ON 7/23/19 AT 0459 BY DARRICK JARVIS CLS  RESULTS READ BACK      HGB 11.7 07/21/2019     Last 3 WBC/ANC  Lab Results   Component Value Date    WBC 4.3 08/20/2019    WBC 6.4 07/23/2019    WBC 7.0 07/21/2019     No components found for: GRNLOCTYABS  Last 3 Platelets  No results found for: PLATELET  Chemistry  [unfilled]  [unfilled]  No results found for: LDH  Coagulation Studies  Lab Results   Component Value Date    INR 1.15 08/20/2019     Liver Function Studies  Lab Results   Component Value Date    ALT 10 08/20/2019    AST 16 08/20/2019    ALKPHOS 49 08/20/2019       Recent Imaging    Josefa Parents #[id number] ([id number]) (79 y.o. M) (Adm: 08/20/19)    [facility] [id number]         Imaging Results (last 7 days)          Procedure Component Value Ref Range Date/Time     CT HEAD WO CONTRAST [[id number]] Collected: 08/20/19 2259     Order Status: Completed Updated: 08/20/19 2302     Narrative:       EXAMINATION:  CT OF THE HEAD WITHOUT CONTRAST  8/20/2019 9:28 pm    TECHNIQUE:  CT of the head was performed without the administration of intravenous  contrast. Dose modulation, iterative reconstruction, and/or weight based  adjustment of the mA/kV was utilized to reduce the radiation dose to as low  as reasonably achievable. COMPARISON:  07/21/2019    HISTORY:  ORDERING SYSTEM PROVIDED HISTORY: confusion  TECHNOLOGIST PROVIDED HISTORY:  Has a \"code stroke\" or \"stroke alert\" been called? ->No  Reason for Exam: confusion, multiple falls, hypotension  Acuity: Acute  Type of Exam: Ongoing  Mechanism of Injury: fall  Relevant Medical/Surgical History: hx brain sx, bladder mass    FINDINGS:  BRAIN/VENTRICLES: The ventricles and sulci are diffusely enlarged.  Low  attenuation is seen in the periventricular and subcortical white matter.  No  acute intracranial hemorrhage or acute infarct is identified. ORBITS: The visualized portion of the orbits demonstrate no acute abnormality. SINUSES: The visualized paranasal sinuses and mastoid air cells demonstrate  no acute abnormality. SOFT TISSUES/SKULL:  No acute abnormality of the visualized skull or soft  tissues.     Impression:       No acute intracranial abnormality. Diffuse atrophic changes with findings suggesting chronic microvascular  ischemia     XR CHEST PORTABLE [[id number]] Collected: 08/20/19 2114     Order Status: Completed Updated: 08/20/19 2117     Narrative:       EXAMINATION:  ONE XRAY VIEW OF THE CHEST    8/20/2019 9:03 pm    COMPARISON:  07/21/2019    HISTORY:  ORDERING SYSTEM PROVIDED HISTORY: hypotension, confusion  TECHNOLOGIST PROVIDED HISTORY:  Reason for exam:->hypotension, confusion  Reason for Exam: confusion HTN    FINDINGS:  Right-sided central venous catheter remains in place terminating in the SVC. The lungs are without acute focal process.  There is no effusion or  pneumothorax. The cardiomediastinal silhouette is stable.  The osseous  structures are stable.     Impression:       No acute process.     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

## 2019-11-22 ENCOUNTER — TELEPHONE (OUTPATIENT)
Dept: FAMILY MEDICINE | Facility: CLINIC | Age: 84
End: 2019-11-22

## 2019-11-22 DIAGNOSIS — M81.0 OSTEOPOROSIS, UNSPECIFIED OSTEOPOROSIS TYPE, UNSPECIFIED PATHOLOGICAL FRACTURE PRESENCE: Primary | ICD-10-CM

## 2019-11-22 NOTE — TELEPHONE ENCOUNTER
Spoke to wife, with pt's verbal agreement for this encounter.    Pt not due for Dexa til after February, but plan is to to see new Endo in [place], however they want dexa done first to be completed before seeing new Endo.    Are you agreeable to ordering Dexa - Plan is to get this done around March/April 2020, with then having a follow up/establish with [place] Endo?    Ivette Hennessy RN

## 2019-11-22 NOTE — TELEPHONE ENCOUNTER
Reason for Call:  Other Permission from Dr Barrera to have Bone Density Scan    Detailed comments: Pt would like for Dr Bruce flynn [phone] ([facility]) to order a Bone Density Scan    Phone Number Patient can be reached at: Home number on file [phone] (home)    Best Time: Any    Can we leave a detailed message on this number? YES    Call taken on 11/22/2019 at 11:43 AM by Breanna Bacon

## 2019-11-22 NOTE — TELEPHONE ENCOUNTER
Message noted.  No.  I recommend he schedule an appointment with the new endocrinologist (Dr. Starr) at [facility] soon (as I noted in my 6/2019 visit AVS) and review the osteoporosis management.  Medicare guidelines allow for the DEXA scan to be done every 2-years... which would be anytime after 2/8/2020.     I will not plan to order a DEXA scan for him no (before then) and advise he make the appt to see Dr. Starr.  Please relay message.    ATIF Barrera MD, MS  Endocrinology  [facility]

## 2020-03-23 ENCOUNTER — VIRTUAL VISIT (OUTPATIENT)
Dept: FAMILY MEDICINE | Facility: CLINIC | Age: 85
End: 2020-03-23
Payer: COMMERCIAL

## 2020-03-23 DIAGNOSIS — R21 RASH: Primary | ICD-10-CM

## 2020-03-23 PROCEDURE — 99442 ZZC PHYSICIAN TELEPHONE EVALUATION 11-20 MIN: CPT | Performed by: FAMILY MEDICINE

## 2020-03-23 RX ORDER — VALACYCLOVIR HYDROCHLORIDE 1 G/1
1000 TABLET, FILM COATED ORAL 3 TIMES DAILY
Qty: 21 TABLET | Refills: 0 | Status: SHIPPED | OUTPATIENT
Start: 2020-03-23 | End: 2020-03-30

## 2020-03-23 RX ORDER — DOXYCYCLINE HYCLATE 100 MG
100 TABLET ORAL 2 TIMES DAILY
Qty: 14 TABLET | Refills: 0 | Status: SHIPPED | OUTPATIENT
Start: 2020-03-23 | End: 2020-03-30

## 2020-03-23 NOTE — PROGRESS NOTES
"Kemar Jamil is a 84 year old male who is being evaluated via a billable telephone visit.      Rash started last week right buttock to calf. Looks like bite. Largest 2inches diameter on calf and slowly increased size. Non-puritic or painful.  Rash 4-5 days ago. No rash contacts. Wife ok. Was walking day before in woods. No ticks found. Starts buttock to 1/2 down tight and area upper calf. Not into foot. No fevers. No body aches or changes in overall health. Not elsewhere.   No pus. Red color. No flaking. Mild pain with pushing on area. Had shingles shot older one.   Speaking to Maryann wife. No trauma to area. No swelling.     The patient has been notified of following:     \"This telephone visit will be conducted via a call between you and your physician/provider. We have found that certain health care needs can be provided without the need for a physical exam.  This service lets us provide the care you need with a short phone conversation.  If a prescription is necessary we can send it directly to your pharmacy.  If lab work is needed we can place an order for that and you can then stop by our lab to have the test done at a later time.    If during the course of the call the physician/provider feels a telephone visit is not appropriate, you will not be charged for this service.\"     Kemar Jamil complains of    Chief Complaint   Patient presents with     Derm Problem     thursday night started rash on buttock and thight, and looked like insect bites. the thigh rash looks blue around the bites. on calf gotten bigger. one on calf looks like individual bites but clustered together. No itching.     Verbal consent for his wife to speak obtained from Kemar because he is not able to hear well with his hearing aids over the phone. Rosalee Alexis CMA    I have reviewed and updated the patient's Past Medical History, Social History, Family History and Medication List.    ALLERGIES  Patient has no known " allergies.        Assessment/Plan:  1. Rash  Most likely shingles given onset and localized (L3 or L4 dermatome). I'm concerned about secondary infection/cellulitis with area of redness. Possible tick bite? But no tick seen. Possible dermatitis but not puritic or flaking.   - valACYclovir (VALTREX) 1000 mg tablet; Take 1 tablet (1,000 mg) by mouth 3 times daily for 7 days For shingles. Pharmacy ok for acyclovir if too expensive  Dispense: 21 tablet; Refill: 0  - doxycycline hyclate (VIBRA-TABS) 100 MG tablet; Take 1 tablet (100 mg) by mouth 2 times daily for 7 days antibiotic  Dispense: 14 tablet; Refill: 0  Reveiwed risks and side effects of medication  Wife to photograph rash and marker around area of redness and closely monitor. Return to clinic if rash not improving overall in next 4-5days or new symptoms. To ER if markedly worsening area of redness/pain or fevers or chills. Expected course and warning signs reviewed. Call/email with questions/concerns     Phone call duration:  12 minutes    Abran Fox MD

## 2020-06-17 DIAGNOSIS — M81.0 AGE-RELATED OSTEOPOROSIS WITHOUT CURRENT PATHOLOGICAL FRACTURE: ICD-10-CM

## 2020-06-18 RX ORDER — ALENDRONATE SODIUM 70 MG/1
TABLET ORAL
Qty: 12 TABLET | Refills: 0 | Status: SHIPPED | OUTPATIENT
Start: 2020-06-18 | End: 2020-10-12

## 2020-09-19 DIAGNOSIS — M81.0 AGE-RELATED OSTEOPOROSIS WITHOUT CURRENT PATHOLOGICAL FRACTURE: ICD-10-CM

## 2020-09-21 NOTE — TELEPHONE ENCOUNTER
"TC,    Please call patient.  Due for visit with Dr Barrera.  Thank you,  Leslee MOHAMUD RN    Last Written Prescription Date: 6/18/2020  Last Fill Quantity: 12,  # refills: 0   Last virtual visit with Endo 6/19/2019.  Future Office Visit:        Requested Prescriptions   Pending Prescriptions Disp Refills     alendronate (FOSAMAX) 70 MG tablet [Pharmacy Med Name: Alendronate Sodium 70 MG Oral Tablet] 12 tablet 0     Sig: TAKE ONE TABLET BY MOUTH WITH 8 OUNCE WATER EVERY 7 DAYS 30 MINUTES BEFORE BREAKFAST AND REMAIN UPRIGHT DURING THIS TIME       Bisphosphonates Failed - 9/19/2020 12:16 PM        Failed - Recent (12 mo) or future (30 days) visit within the authorizing provider's specialty     Patient has had an office visit with the authorizing provider or a provider within the authorizing providers department within the previous 12 mos or has a future within next 30 days. See \"Patient Info\" tab in inbasket, or \"Choose Columns\" in Meds & Orders section of the refill encounter.              Failed - Dexa on file within past 2 years     Please review last Dexa result.           Failed - Normal serum creatinine on file within past 12 months     Recent Labs   Lab Test 05/09/19  1125   CR 0.81       Ok to refill medication if creatinine is low          Passed - Medication is active on med list        Passed - Patient is age 18 or older             "

## 2020-09-29 ENCOUNTER — VIRTUAL VISIT (OUTPATIENT)
Dept: ENDOCRINOLOGY | Facility: CLINIC | Age: 85
End: 2020-09-29
Payer: COMMERCIAL

## 2020-09-29 DIAGNOSIS — M81.0 AGE-RELATED OSTEOPOROSIS WITHOUT CURRENT PATHOLOGICAL FRACTURE: Primary | ICD-10-CM

## 2020-09-29 PROCEDURE — 99213 OFFICE O/P EST LOW 20 MIN: CPT | Mod: 95 | Performed by: INTERNAL MEDICINE

## 2020-09-29 NOTE — PROGRESS NOTES
"Kemar Jamil is a 85 year old male who is being evaluated via a billable telephone visit.      The patient has been notified of following:     \"This telephone visit will be conducted via a call between you and your physician/provider. We have found that certain health care needs can be provided without the need for a physical exam.  This service lets us provide the care you need with a short phone conversation.  If a prescription is necessary we can send it directly to your pharmacy.  If lab work is needed we can place an order for that and you can then stop by our lab to have the test done at a later time.    Telephone visits are billed at different rates depending on your insurance coverage. During this emergency period, for some insurers they may be billed the same as an in-person visit.  Please reach out to your insurance provider with any questions.    If during the course of the call the physician/provider feels a telephone visit is not appropriate, you will not be charged for this service.\"    Patient has given verbal consent for Telephone visit?  Yes    What phone number would you like to be contacted at? [phone]    How would you like to obtain your AVS? Mail a copy     S:   Pt being seen in f/u for osteoporosis.  Previously seen by Dr Barrera.  \"Here for f/u osteoporosis evaluation, with female family member today  He restarted alendronate medication 4/2018, tolerating well without issues.     Patient recalls diagnosis of osteoporosis approx age 72  Has had several DEXA scans in the past, including '06, '07, '09, '11, '12, 2/2018  Took Fosamax weekly (for 2 yrs?) until 2014, though details not available  He wonders if he may have taken another osteoporosis med, such as Actonel  Available records indicate scans:  4/14/06 DEXA:                          L2-4 T -2.3              Left femur neck T -2.3  Scans 4/2007- 3/2012:              L2-4 T scores range T -1.5 to -2.3              Left hip femur T " "scores range -2.3 to -2.5  2/7/18 DEXA:              L1-3 T -2.2, marked degen changes L4              Left femur neck T -2.6 and right fem neck T -2.5     Previous bone fractures:  Leg age 5, also CHI and skull fracture age 17  No history of kidney stones or chronic steroid med use   4/2018. Started alendronate 70 mg weekly dose  Takes calcium +D 600-200 1-tablet daily, also separate vitamin D 2000 U daily \"    Today, his wife explains that she thinks he never took the fosamax in [id number].   Confirms he is compliant with fosamax use.   2000 international unit(s) D3 every day, 600 mg of Ca every day.   2 servings of dairy a day.     Currently weighs 121 pounds.   Wife notes it is hard to get him to eat.   No complaints of nausea, vomiting, diarrhea, constipation.   She has lots of questions as far as diet which I did my best to answer. Offered dietician referral which she declined.     Notes he has a hunched back which is a new issue.   Not c/o pain.     No falls in the last year.   Walks 30 minutes a day.     Occasional pain in the left knee and left buttocks.     ROS: 10 point ROS neg other than the symptoms noted above in the HPI.    O:  Gen: In NAD.     A/P:   Osteoporosis - On fosamax from 1534-6722(?) and began to use again in 4/2018. Risks/benefits of fosamax use reviewed. High falls risks due to poor vision and poor hearing.   -X ray thoracic and lumbar spine for \"hunching\".   -Schedule labs and DEXA scan.   -Discussed finding a third serving of dairy a day.   -Refill fosamax after above completed.     Due to the COVID 19 pandemic this visit was a telephone/video visit in order to help prevent spread of infection in this high risk patient and the general population. The patient gave verbal consent for the visit today.    Start time 1000  Stop time 1022  Total time 22  This visit would have been billed as 08341 as an E & M code      Keegan Starr MD on 9/29/2020 at 10:49 AM            "

## 2020-10-02 ENCOUNTER — ANCILLARY PROCEDURE (OUTPATIENT)
Dept: GENERAL RADIOLOGY | Facility: CLINIC | Age: 85
End: 2020-10-02
Attending: INTERNAL MEDICINE
Payer: COMMERCIAL

## 2020-10-02 ENCOUNTER — ANCILLARY PROCEDURE (OUTPATIENT)
Dept: BONE DENSITY | Facility: CLINIC | Age: 85
End: 2020-10-02
Attending: INTERNAL MEDICINE
Payer: COMMERCIAL

## 2020-10-02 DIAGNOSIS — M81.0 AGE-RELATED OSTEOPOROSIS WITHOUT CURRENT PATHOLOGICAL FRACTURE: ICD-10-CM

## 2020-10-02 LAB
ALBUMIN SERPL-MCNC: 4.6 G/DL (ref 3.4–5)
CALCIUM SERPL-MCNC: 9.1 MG/DL (ref 8.5–10.1)
CREAT SERPL-MCNC: 0.86 MG/DL (ref 0.66–1.25)
GFR SERPL CREATININE-BSD FRML MDRD: 79 ML/MIN/{1.73_M2}
MAGNESIUM SERPL-MCNC: 2.4 MG/DL (ref 1.6–2.3)
PHOSPHATE SERPL-MCNC: 3.1 MG/DL (ref 2.5–4.5)
PTH-INTACT SERPL-MCNC: 94 PG/ML (ref 18–80)

## 2020-10-02 PROCEDURE — 99000 SPECIMEN HANDLING OFFICE-LAB: CPT | Performed by: INTERNAL MEDICINE

## 2020-10-02 PROCEDURE — 72100 X-RAY EXAM L-S SPINE 2/3 VWS: CPT | Performed by: RADIOLOGY

## 2020-10-02 PROCEDURE — 83970 ASSAY OF PARATHORMONE: CPT | Performed by: INTERNAL MEDICINE

## 2020-10-02 PROCEDURE — 82565 ASSAY OF CREATININE: CPT | Performed by: INTERNAL MEDICINE

## 2020-10-02 PROCEDURE — 82040 ASSAY OF SERUM ALBUMIN: CPT | Performed by: INTERNAL MEDICINE

## 2020-10-02 PROCEDURE — 84100 ASSAY OF PHOSPHORUS: CPT | Performed by: INTERNAL MEDICINE

## 2020-10-02 PROCEDURE — 82310 ASSAY OF CALCIUM: CPT | Performed by: INTERNAL MEDICINE

## 2020-10-02 PROCEDURE — 84080 ASSAY ALKALINE PHOSPHATASES: CPT | Mod: 90 | Performed by: INTERNAL MEDICINE

## 2020-10-02 PROCEDURE — 36415 COLL VENOUS BLD VENIPUNCTURE: CPT | Performed by: INTERNAL MEDICINE

## 2020-10-02 PROCEDURE — 77085 DXA BONE DENSITY AXL VRT FX: CPT | Performed by: INTERNAL MEDICINE

## 2020-10-02 PROCEDURE — 83735 ASSAY OF MAGNESIUM: CPT | Performed by: INTERNAL MEDICINE

## 2020-10-02 PROCEDURE — 72072 X-RAY EXAM THORAC SPINE 3VWS: CPT | Performed by: RADIOLOGY

## 2020-10-02 PROCEDURE — 82306 VITAMIN D 25 HYDROXY: CPT | Performed by: INTERNAL MEDICINE

## 2020-10-02 NOTE — LETTER
October 5, 2020      Kemar Jamil  [address] [facility] [address]        Dear ,    We are writing to inform you of your test results.    No fractures seen.   His labs are still pending.     Resulted Orders   XR Thoracic Spine 3 Views    Narrative    THORACIC SPINE THREE VIEWS  10/2/2020 11:17 AM     HISTORY: Hunched back, compression fracture question. Age-related  osteoporosis without current pathological fracture.    COMPARISON: None.      Impression    IMPRESSION: Minimal dextroconvex curvature of the midthoracic spine.  Alignment otherwise is within normal limits. No gross vertebral body  height loss. Multilevel degenerative disc disease, appearing  radiographically most pronounced in the mid thoracic spine at T6-T7,  T7-T8 and T8-T9. Scattered marginal endplate osteophytes.    SOFIYA SOTELO MD       If you have any questions or concerns, please call the clinic at the number listed above.       Sincerely,    Keegan Starr MD

## 2020-10-02 NOTE — LETTER
October 5, 2020      Kemar Jamil  [address] [facility] [address]        Dear ,    We are writing to inform you of your test results.    No fractures seen.   His labs are still pending.     Resulted Orders   XR Lumbar Spine 2/3 Views    Narrative    LUMBAR SPINE TWO TO THREE VIEWS  10/2/2020 11:17 AM     HISTORY: Hunched back, compression fracture. Age-related osteoporosis  without current pathological fracture.    COMPARISON: None. Correlation is made with thoracic spine radiographs  of same date.      Impression    IMPRESSION: Small T12 ribs with 5 nonrib-bearing lumbar vertebral  bodies. There appears to be some degree of partial sacralization of L5  with elongation of the bilateral L5 transverse processes. Mild  dextroconvex curvature of the lower thoracic spine and levoconvex  curvature of the mid lumbar spine. No gross vertebral body height  loss. Grade 1 anterolisthesis of L4 on L5 measuring approximately 4-5  mm. There is also trace retrolisthesis of L1 on L2. Mild multilevel  degenerative disc space narrowing. Multilevel facet arthrosis,  appearing most pronounced in the mid to lower lumbar spine.    SOFIYA SOTELO MD       If you have any questions or concerns, please call the clinic at the number listed above.     Sincerely,    Keegan Starr MD

## 2020-10-03 LAB — ALP BONE SERPL-MCNC: 9.8 UG/L (ref 6.5–20.1)

## 2020-10-06 DIAGNOSIS — M81.0 AGE-RELATED OSTEOPOROSIS WITHOUT CURRENT PATHOLOGICAL FRACTURE: Primary | ICD-10-CM

## 2020-10-06 LAB
DEPRECATED CALCIDIOL+CALCIFEROL SERPL-MC: <52 UG/L (ref 20–75)
VITAMIN D2 SERPL-MCNC: <5 UG/L
VITAMIN D3 SERPL-MCNC: 47 UG/L

## 2020-10-07 DIAGNOSIS — M81.0 AGE-RELATED OSTEOPOROSIS WITHOUT CURRENT PATHOLOGICAL FRACTURE: ICD-10-CM

## 2020-10-07 LAB
ALBUMIN SERPL-MCNC: 3.9 G/DL (ref 3.4–5)
CALCIUM SERPL-MCNC: 9.2 MG/DL (ref 8.5–10.1)
CREAT SERPL-MCNC: 0.88 MG/DL (ref 0.66–1.25)
GFR SERPL CREATININE-BSD FRML MDRD: 78 ML/MIN/{1.73_M2}
MAGNESIUM SERPL-MCNC: 2.3 MG/DL (ref 1.6–2.3)
PHOSPHATE SERPL-MCNC: 2.7 MG/DL (ref 2.5–4.5)
PTH-INTACT SERPL-MCNC: 59 PG/ML (ref 18–80)

## 2020-10-07 PROCEDURE — 83735 ASSAY OF MAGNESIUM: CPT | Performed by: INTERNAL MEDICINE

## 2020-10-07 PROCEDURE — 82310 ASSAY OF CALCIUM: CPT | Performed by: INTERNAL MEDICINE

## 2020-10-07 PROCEDURE — 82565 ASSAY OF CREATININE: CPT | Performed by: INTERNAL MEDICINE

## 2020-10-07 PROCEDURE — 83970 ASSAY OF PARATHORMONE: CPT | Performed by: INTERNAL MEDICINE

## 2020-10-07 PROCEDURE — 82040 ASSAY OF SERUM ALBUMIN: CPT | Performed by: INTERNAL MEDICINE

## 2020-10-07 PROCEDURE — 84100 ASSAY OF PHOSPHORUS: CPT | Performed by: INTERNAL MEDICINE

## 2020-10-08 ENCOUNTER — VIRTUAL VISIT (OUTPATIENT)
Dept: ENDOCRINOLOGY | Facility: CLINIC | Age: 85
End: 2020-10-08
Payer: COMMERCIAL

## 2020-10-08 DIAGNOSIS — M81.0 AGE-RELATED OSTEOPOROSIS WITHOUT CURRENT PATHOLOGICAL FRACTURE: Primary | ICD-10-CM

## 2020-10-08 PROCEDURE — 99214 OFFICE O/P EST MOD 30 MIN: CPT | Mod: 95 | Performed by: INTERNAL MEDICINE

## 2020-10-08 NOTE — LETTER
"    10/8/2020         RE: Kemar Jamil  [address] [facility] [address]        Dear Colleague,    Thank you for referring your patient, Kemar Jamil, to the [facility] ENDOCRINOLOGY. Please see a copy of my visit note below.    Kemar Jamil is a 85 year old male who is being evaluated via a billable telephone visit.      The patient has been notified of following:     \"This telephone visit will be conducted via a call between you and your physician/provider. We have found that certain health care needs can be provided without the need for a physical exam.  This service lets us provide the care you need with a short phone conversation.  If a prescription is necessary we can send it directly to your pharmacy.  If lab work is needed we can place an order for that and you can then stop by our lab to have the test done at a later time.    Telephone visits are billed at different rates depending on your insurance coverage. During this emergency period, for some insurers they may be billed the same as an in-person visit.  Please reach out to your insurance provider with any questions.    If during the course of the call the physician/provider feels a telephone visit is not appropriate, you will not be charged for this service.\"    Patient has given verbal consent for Telephone visit?  Yes    What phone number would you like to be contacted at? [phone]    How would you like to obtain your AVS? Mail a copy    S:   Pt being seen in f/u for osteoporosis.  Previously seen by Dr Barrera.  \"Here for f/u osteoporosis evaluation, with female family member today  He restarted alendronate medication 4/2018, tolerating well without issues.     Patient recalls diagnosis of osteoporosis approx age 72  Has had several DEXA scans in the past, including '06, '07, '09, '11, '12, 2/2018  Took Fosamax weekly (for 2 yrs?) until 2014, though details not available  He wonders if he may have taken " "another osteoporosis med, such as Actonel  Available records indicate scans:  4/14/06 DEXA:                          L2-4 T -2.3              Left femur neck T -2.3  Scans 4/2007- 3/2012:              L2-4 T scores range T -1.5 to -2.3              Left hip femur T scores range -2.3 to -2.5  2/7/18 DEXA:              L1-3 T -2.2, marked degen changes L4              Left femur neck T -2.6 and right fem neck T -2.5     Previous bone fractures:  Leg age 5, also CHI and skull fracture age 17  No history of kidney stones or chronic steroid med use   4/2018. Started alendronate 70 mg weekly dose  Takes calcium +D 600-200 1-tablet daily, also separate vitamin D 2000 U daily \"     In 9/2020, his wife explains that she thinks he never took the fosamax in 4046-4591.   Confirms he is compliant with fosamax use.   2000 international unit(s) D3 every day, 600 mg of Ca every day.   2 servings of dairy a day.      Currently weighs 121 pounds.   Wife notes it is hard to get him to eat.   No complaints of nausea, vomiting, diarrhea, constipation.   She has lots of questions as far as diet which I did my best to answer. Offered dietician referral which she declined.      Notes he has a hunched back which is a new issue.   Not c/o pain.      No falls in the last year.   Walks 30 minutes a day.      Occasional pain in the left knee and left buttocks.      Here today to review his DEXA.     ROS: 10 point ROS neg other than the symptoms noted above in the HPI.     O:   Gen: In NAD.      A/P:   Osteoporosis - On fosamax from 7383-5276(?) and began to use again in 4/2018. Risks/benefits of fosamax use reviewed. High falls risks due to poor vision and poor hearing.   X ray thoracic and lumbar spine for \"hunching\" on 10/2/2020 did not reveal compression fractures.   DEXA from 10/2/2020 showed no significant change in BMD.   In 10/2020, discussed changing to prolia or forteo to help improve BMD and move him out of the osteoporosis range. " Risks/benefits reviewed.   They do not like the idea of a daily injection. They are also worried about side effects even given my explanations. Seems to be mostly driven by his wife who has refused osteoporosis drugs in the past based on rumors from friends. Despite her protestations, I suspect she is guiding his decision making as she dominates the conversation. Readdressed the risks/benefits of fosamax use.   -Labs and DEXA in 1 year.   -Rx for prolia at [facility].      Due to the COVID 19 pandemic this visit was a telephone/video visit in order to help prevent spread of infection in this high risk patient and the general population. The patient gave verbal consent for the visit today.    Start time 1033  Stop time  1100  Total time  27  This visit would have been billed as 71435 as an E & M code     Keegan Starr MD on 10/8/2020 at 11:01 AM            Again, thank you for allowing me to participate in the care of your patient.        Sincerely,        Keegan Starr MD

## 2020-10-08 NOTE — PATIENT INSTRUCTIONS
PROLIA  Risk Evaluation and Mitigation Strategy Best Practice Advisory    In May 2015, the FDA required an update to the PROLIA  (denosumab) REMS (Risk Evaluation and Mitigation Strategy) to inform both providers and patients about potential serious risks related to PROLIA .    These potential serious risks include:    Hypocalcemia    Osteonecrosis of the jaw    Atypical femoral fractures    Serious Infections    Dermatologic reactions    To ensure compliance with these requirements [facility] has developed a workflow for those patients who will receive PROLIA  at clinic.  This workflow includes insurance verification, patient scheduling, patient education, and documentation. Registered Nurses in the clinic should have completed eLearning related to the REMS and the clinic workflow.  Refer to them to initiate this process.  This workflow does not need to be executed if the patient is to receive PROLIA  injection at [facility].       The  has put together a Patient Education Toolkit.  Copies of these handouts may be available your clinic. Patients must receive the Patient Brochure and Medication Guide when receiving injection at clinic.  These will be attached to the injection ordered from [facility] [employer] to the clinic. Links to handouts:  Patient Counseling Chart for Healthcare Providers  Patient Brochure  Prescribing Information  Medication Guide    If you are having trouble accessing the above links, these documents can be found at: [email or web address]    The role of healthcare provider includes reviewing patient education materials with the patient, advising patients to seek medical attention if they have signs or symptoms of one of the serious risks, and provide patients a copy of the Patient Brochure and Medication Guide when receiving their injection.      Due to the risk of hypocalcemia the Prescribing  Information for PROLIA  recommends that calcium and mineral levels (magnesium and phosphorus) be checked within 14 days of injection for those predisposed to hypocalcemia.      PROLIA  Risk Evaluation and Mitigation Strategy Best Practice Advisory    In May 2015, the FDA required an update to the PROLIA  (denosumab) REMS (Risk Evaluation and Mitigation Strategy) to inform both providers and patients about potential serious risks related to PROLIA .    These potential serious risks include:    Hypocalcemia    Osteonecrosis of the jaw    Atypical femoral fractures    Serious Infections    Dermatologic reactions    To ensure compliance with these requirements [place] has developed a workflow for those patients who will receive PROLIA  at clinic.  This workflow includes insurance verification, patient scheduling, patient education, and documentation. Registered Nurses in the clinic should have completed eLearning related to the REMS and the clinic workflow.  Refer to them to initiate this process.  This workflow does not need to be executed if the patient is to receive PROLIA  injection at [facility].       The  has put together a Patient Education Toolkit.  Copies of these handouts may be available your clinic. Patients must receive the Patient Brochure and Medication Guide when receiving injection at clinic.  These will be attached to the injection ordered from [facility] [employer] to the clinic. Links to handouts:  Patient Counseling Chart for Healthcare Providers  Patient Brochure  Prescribing Information  Medication Guide    If you are having trouble accessing the above links, these documents can be found at: [email or web address]    The role of healthcare provider includes reviewing patient education materials with the patient, advising patients to seek medical attention if they have signs or symptoms of one of the  serious risks, and provide patients a copy of the Patient Brochure and Medication Guide when receiving their injection.      Due to the risk of hypocalcemia the Prescribing Information for PROLIA  recommends that calcium and mineral levels (magnesium and phosphorus) be checked within 14 days of injection for those predisposed to hypocalcemia.

## 2020-10-08 NOTE — PROGRESS NOTES
"Kemar Jamil is a 85 year old male who is being evaluated via a billable telephone visit.      The patient has been notified of following:     \"This telephone visit will be conducted via a call between you and your physician/provider. We have found that certain health care needs can be provided without the need for a physical exam.  This service lets us provide the care you need with a short phone conversation.  If a prescription is necessary we can send it directly to your pharmacy.  If lab work is needed we can place an order for that and you can then stop by our lab to have the test done at a later time.    Telephone visits are billed at different rates depending on your insurance coverage. During this emergency period, for some insurers they may be billed the same as an in-person visit.  Please reach out to your insurance provider with any questions.    If during the course of the call the physician/provider feels a telephone visit is not appropriate, you will not be charged for this service.\"    Patient has given verbal consent for Telephone visit?  Yes    What phone number would you like to be contacted at? [phone]    How would you like to obtain your AVS? Mail a copy    S:   Pt being seen in f/u for osteoporosis.  Previously seen by Dr Barrera.  \"Here for f/u osteoporosis evaluation, with female family member today  He restarted alendronate medication 4/2018, tolerating well without issues.     Patient recalls diagnosis of osteoporosis approx age 72  Has had several DEXA scans in the past, including '06, '07, '09, '11, '12, 2/2018  Took Fosamax weekly (for 2 yrs?) until 2014, though details not available  He wonders if he may have taken another osteoporosis med, such as Actonel  Available records indicate scans:  4/14/06 DEXA:                          L2-4 T -2.3              Left femur neck T -2.3  Scans 4/2007- 3/2012:              L2-4 T scores range T -1.5 to -2.3              Left hip femur T " "scores range -2.3 to -2.5  2/7/18 DEXA:              L1-3 T -2.2, marked degen changes L4              Left femur neck T -2.6 and right fem neck T -2.5     Previous bone fractures:  Leg age 5, also CHI and skull fracture age 17  No history of kidney stones or chronic steroid med use   4/2018. Started alendronate 70 mg weekly dose  Takes calcium +D 600-200 1-tablet daily, also separate vitamin D 2000 U daily \"     In 9/2020, his wife explains that she thinks he never took the fosamax in 9755-2357.   Confirms he is compliant with fosamax use.   2000 international unit(s) D3 every day, 600 mg of Ca every day.   2 servings of dairy a day.      Currently weighs 121 pounds.   Wife notes it is hard to get him to eat.   No complaints of nausea, vomiting, diarrhea, constipation.   She has lots of questions as far as diet which I did my best to answer. Offered dietician referral which she declined.      Notes he has a hunched back which is a new issue.   Not c/o pain.      No falls in the last year.   Walks 30 minutes a day.      Occasional pain in the left knee and left buttocks.      Here today to review his DEXA.     ROS: 10 point ROS neg other than the symptoms noted above in the HPI.     O:   Gen: In NAD.      A/P:   Osteoporosis - On fosamax from 2518-5227(?) and began to use again in 4/2018. Risks/benefits of fosamax use reviewed. High falls risks due to poor vision and poor hearing.   X ray thoracic and lumbar spine for \"hunching\" on 10/2/2020 did not reveal compression fractures.   DEXA from 10/2/2020 showed no significant change in BMD.   In 10/2020, discussed changing to prolia or forteo to help improve BMD and move him out of the osteoporosis range. Risks/benefits reviewed.   They do not like the idea of a daily injection. They are also worried about side effects even given my explanations. Seems to be mostly driven by his wife who has refused osteoporosis drugs in the past based on rumors from friends. Despite " her protestations, I suspect she is guiding his decision making as she dominates the conversation. Readdressed the risks/benefits of fosamax use.   -Labs and DEXA in 1 year.   -Rx for prolia at [place].      Due to the COVID 19 pandemic this visit was a telephone/video visit in order to help prevent spread of infection in this high risk patient and the general population. The patient gave verbal consent for the visit today.    Start time 1033  Stop time  1100  Total time  27  This visit would have been billed as 16478 as an E & M code     Keegan Starr MD on 10/8/2020 at 11:01 AM

## 2020-10-08 NOTE — Clinical Note
Prolia Rx sent. Please arrange first injection once approved.   Keegan Starr MD on 10/8/2020 at 11:02 AM

## 2020-10-08 NOTE — RESULT ENCOUNTER NOTE
"Called and spoke to patient, informed per Dr. Starr: \" Repeat labs normal.   I would like to talk to them about some other medication options to help improve his bone density.   Please schedule follow up visit.  \"    Patient has been scheduled for today at 10:30am      Understanding voiced and no further questions noted.     Jumana RM MA  "

## 2020-10-11 ENCOUNTER — ALLIED HEALTH/NURSE VISIT (OUTPATIENT)
Dept: NURSING | Facility: CLINIC | Age: 85
End: 2020-10-11
Payer: COMMERCIAL

## 2020-10-11 DIAGNOSIS — Z23 NEED FOR PROPHYLACTIC VACCINATION AND INOCULATION AGAINST INFLUENZA: Primary | ICD-10-CM

## 2020-10-11 PROCEDURE — G0008 ADMIN INFLUENZA VIRUS VAC: HCPCS

## 2020-10-11 PROCEDURE — 90662 IIV NO PRSV INCREASED AG IM: CPT

## 2020-10-11 NOTE — NURSING NOTE
Patient consents to receive outdoor care: Yes    Upon arrival, patient instructed to proceed to designated location, place vehicle in park, turn off, and remove keys  and Patient receiving an immunization or injection. Instructed patient to notify healthcare personnel if they are having an adverse reaction.    If we are unable to safely and ergonomically able to provide care- is the patient able to safely able to get out of car and transfer to a chair? Yes        Patient would like to receive their AVS via mail.

## 2020-10-12 ENCOUNTER — TELEPHONE (OUTPATIENT)
Dept: ENDOCRINOLOGY | Facility: CLINIC | Age: 85
End: 2020-10-12

## 2020-10-12 DIAGNOSIS — M81.0 AGE-RELATED OSTEOPOROSIS WITHOUT CURRENT PATHOLOGICAL FRACTURE: ICD-10-CM

## 2020-10-12 RX ORDER — ALENDRONATE SODIUM 70 MG/1
TABLET ORAL
Qty: 12 TABLET | Refills: 3 | Status: SHIPPED | OUTPATIENT
Start: 2020-10-12 | End: 2021-02-09

## 2020-10-12 NOTE — TELEPHONE ENCOUNTER
----- Message from Keegan Starr MD sent at 10/8/2020  2:46 PM CDT -----  Regarding: FW: Prolia  Prolia is not approved for him unfortunately.   Therefore we will continue fosamax.   Please renew his Rx if needed.   Keegan Starr MD on 10/8/2020 at 2:47 PM  ----- Message -----  From: Ti Lopez  Sent: 10/8/2020   2:33 PM CDT  To: Keegan Starr MD  Subject: RE: Prolia                                       Hi Dr. Starr,    If it is being done at home, I am unable to verify benefits.  I would recommend sending a RX to the patient's pharmacy and having them run it through insurance to see if it requires a PA.  Sorry I can't be of more help with [facility] since our department only does medical benefit verifications and not pharmacy.    Thank you,    Ti Martinez  ----- Message -----  From: Keegan Starr MD  Sent: 10/8/2020   2:21 PM CDT  To: Ti Lopez  Subject: RE: Prolia                                       Home.   Keegan Starr MD on 10/8/2020 at 2:21 PM  ----- Message -----  From: Ti Lopez  Sent: 10/8/2020  12:41 PM CDT  To: Keegan Starr MD  Subject: RE: Prolia                                       Would the patient be receiving the injection in clinic with that or at home?    Thank you,    Ti Martinez  ----- Message -----  From: Keegan Starr MD  Sent: 10/8/2020  12:24 PM CDT  To: Ti Lopez  Subject: RE: Prolia                                       Ok.   Would the requirements be the same for switching to forteo?    Keegan Starr MD on 10/8/2020 at 12:25 PM  ----- Message -----  From: Ti Lopez  Sent: 10/8/2020  12:13 PM CDT  To: Keegan Starr MD  Subject: Prolia                                           Zen Starr,    According to [facility]'s policy, if the patient does not meet certain requirements, Prolia is not going to be approved.  I did not find information in the patient's chart to determine that he  met [employer]'s requirement.  The requirements are listed below.    - The member has had previous treatment with, contraindication or intolerance to zolendronic acid OR  - The member has a history of osteoporotic fracture.    If one or the other requirement isn't met, [facility] will not approve Prolia.    Thank you,    Ti Martinez

## 2020-10-12 NOTE — TELEPHONE ENCOUNTER
Called patient who answered and said he was very hard of hearing. He provided a verbal consent to communicate with his wife. RN informed patient's wife of Dr. Starr's message below. Wife verbalized understanding and requested that refills of fosamax be sent. RN sent in refills. Patient and wife had no questions.    Prolia is not approved for him unfortunately.   Therefore we will continue fosamax.   Please renew his Rx if needed.     Keegan Starr MD on 10/8/2020 at 2:47 PM     Tyrese Mccurdy RN....10/12/2020 1:53 PM

## 2020-11-13 RX ORDER — ALENDRONATE SODIUM 70 MG/1
TABLET ORAL
Qty: 4 TABLET | Refills: 0 | OUTPATIENT
Start: 2020-11-13

## 2021-02-09 DIAGNOSIS — M81.0 AGE-RELATED OSTEOPOROSIS WITHOUT CURRENT PATHOLOGICAL FRACTURE: ICD-10-CM

## 2021-02-09 RX ORDER — ALENDRONATE SODIUM 70 MG/1
TABLET ORAL
Qty: 12 TABLET | Refills: 3 | Status: SHIPPED | OUTPATIENT
Start: 2021-02-09 | End: 2021-03-10

## 2021-03-10 ENCOUNTER — TELEPHONE (OUTPATIENT)
Dept: ENDOCRINOLOGY | Facility: CLINIC | Age: 86
End: 2021-03-10

## 2021-03-10 DIAGNOSIS — M81.0 AGE-RELATED OSTEOPOROSIS WITHOUT CURRENT PATHOLOGICAL FRACTURE: ICD-10-CM

## 2021-03-10 RX ORDER — ALENDRONATE SODIUM 70 MG/1
TABLET ORAL
Qty: 12 TABLET | Refills: 3 | Status: SHIPPED | OUTPATIENT
Start: 2021-03-10 | End: 2021-03-11

## 2021-03-10 NOTE — TELEPHONE ENCOUNTER
Reason for Call:  Medication or medication refill:    Do you use a [facility]?  Name of the pharmacy and phone number for the current request:  [facility]     Name of the medication requested: alendronate (FOSAMAX) 70 MG tablet    Other request: None    Can we leave a detailed message on this number? YES    Phone number patient can be reached at: Home number on file [phone] (home)    Best Time: Any    Call taken on 3/10/2021 at 11:44 AM by Radha Santana

## 2021-03-11 RX ORDER — ALENDRONATE SODIUM 70 MG/1
TABLET ORAL
Qty: 12 TABLET | Refills: 3 | Status: SHIPPED | OUTPATIENT
Start: 2021-03-11 | End: 2021-12-08

## 2021-03-11 NOTE — TELEPHONE ENCOUNTER
Fosamax was refilled yesterday to the [facility] pharmacy. RN transferred the script to [facility]. Patient and wife were informed and will call me with any issues.    Tyrese NEWTON RN....3/11/2021 9:04 AM

## 2021-12-07 DIAGNOSIS — M81.0 AGE-RELATED OSTEOPOROSIS WITHOUT CURRENT PATHOLOGICAL FRACTURE: ICD-10-CM

## 2021-12-08 RX ORDER — ALENDRONATE SODIUM 70 MG/1
TABLET ORAL
Qty: 12 TABLET | Refills: 3 | Status: SHIPPED | OUTPATIENT
Start: 2021-12-08 | End: 2022-10-24

## 2021-12-08 NOTE — TELEPHONE ENCOUNTER
Pending Prescriptions:                       Disp   Refills    alendronate (FOSAMAX) 70 MG tablet [Pharm*12 tab*3            Sig: TAKE 1 TABLET BY MOUTH  WEEKLY WITH 8 OZ OF PLAIN           WATER 30 MINUTES BEFORE  FIRST FOOD, DRINK OR           MEDS.  STAY UPRIGHT FOR 30 MINS    Routing refill request to provider for review/approval because:  Patient needs to be seen because it has been more than 1 year since last office visit.  No recent Cr on file.    Tana Osorio RN

## 2022-06-01 NOTE — TELEPHONE ENCOUNTER
Ok thank you.    These numbers look fairly good. No need for medications.    I see that you have set up endocrine appointment on 4/4/18 which is good.    Noe Andrew M.D.     (4) Less than 3 years old

## 2022-10-19 DIAGNOSIS — M81.0 AGE-RELATED OSTEOPOROSIS WITHOUT CURRENT PATHOLOGICAL FRACTURE: ICD-10-CM

## 2022-10-19 NOTE — TELEPHONE ENCOUNTER
Requested Prescriptions   Pending Prescriptions Disp Refills     alendronate (FOSAMAX) 70 MG tablet 12 tablet 3     Sig: TAKE 1 TABLET BY MOUTH  WEEKLY WITH 8 OZ OF PLAIN  WATER 30 MINUTES BEFORE  FIRST FOOD, DRINK OR MEDS.  STAY UPRIGHT FOR 30 MINS       There is no refill protocol information for this order        Last office visit: Visit date not found with prescribing provider:  Dr. Starr    Future Office Visit:          Le[facility] Moraless  [facility]

## 2022-10-24 RX ORDER — ALENDRONATE SODIUM 70 MG/1
TABLET ORAL
Qty: 12 TABLET | Refills: 0 | Status: SHIPPED | OUTPATIENT
Start: 2022-10-24 | End: 2023-01-06

## 2022-10-24 NOTE — TELEPHONE ENCOUNTER
alendronate (FOSAMAX) 70 MG tablet      Last Written Prescription Date:  12/8/2021  Last Fill Quantity: 12,   # refills: 3  Last Office Visit : 12/8/2020 *Dr Starr[facility]  Future Office visit:  none    Routing refill request to on-call Endocrine provider for review/approval because:  Former Dr Starr patient  - patient has not re-established care with other Endocrine provider   - 2 years since last visit 12/8/2020  - no future visit

## 2023-03-25 DIAGNOSIS — M81.0 AGE-RELATED OSTEOPOROSIS WITHOUT CURRENT PATHOLOGICAL FRACTURE: ICD-10-CM

## 2023-03-28 RX ORDER — ALENDRONATE SODIUM 70 MG/1
TABLET ORAL
Qty: 12 TABLET | Refills: 3 | Status: SHIPPED | OUTPATIENT
Start: 2023-03-28 | End: 2024-02-07

## 2023-03-28 NOTE — TELEPHONE ENCOUNTER
alendronate (FOSAMAX) 70 MG   Last Written Prescription Date:  1/\6/23  Last Fill Quantity: 4   # refills: 3  Last Office Visit :10/8/2020  Future Office visit:  NONE    Routing refill request to provider for review/approval because:     Bisphosphonates Failed 01/05/2023 09:05 PM   Protocol Details  Recent (12 mo) or future (30 days) visit within the authorizing provider's specialty    Dexa on file within past 2 years    Normal serum creatinine on file within past 12 months      Lab Test 10/07/20  1107   CR 0.88     * Scheduling has been notified to contact the pt for appointment.

## 2023-08-01 ENCOUNTER — ANCILLARY ORDERS (OUTPATIENT)
Dept: BONE DENSITY | Facility: CLINIC | Age: 88
End: 2023-08-01

## 2023-08-01 DIAGNOSIS — M81.0 OSTEOPOROSIS, UNSPECIFIED OSTEOPOROSIS TYPE, UNSPECIFIED PATHOLOGICAL FRACTURE PRESENCE: Primary | ICD-10-CM

## 2023-08-15 ENCOUNTER — ANCILLARY PROCEDURE (OUTPATIENT)
Dept: BONE DENSITY | Facility: CLINIC | Age: 88
End: 2023-08-15
Attending: FAMILY MEDICINE
Payer: COMMERCIAL

## 2023-08-15 ENCOUNTER — ANCILLARY ORDERS (OUTPATIENT)
Dept: BONE DENSITY | Facility: CLINIC | Age: 88
End: 2023-08-15

## 2023-08-15 DIAGNOSIS — M81.0 OSTEOPOROSIS, UNSPECIFIED OSTEOPOROSIS TYPE, UNSPECIFIED PATHOLOGICAL FRACTURE PRESENCE: Primary | ICD-10-CM

## 2023-08-15 DIAGNOSIS — M81.0 OSTEOPOROSIS, UNSPECIFIED OSTEOPOROSIS TYPE, UNSPECIFIED PATHOLOGICAL FRACTURE PRESENCE: ICD-10-CM

## 2023-08-15 PROCEDURE — 77085 DXA BONE DENSITY AXL VRT FX: CPT | Performed by: INTERNAL MEDICINE

## 2024-02-04 DIAGNOSIS — M81.0 AGE-RELATED OSTEOPOROSIS WITHOUT CURRENT PATHOLOGICAL FRACTURE: ICD-10-CM

## 2024-02-07 ENCOUNTER — TELEPHONE (OUTPATIENT)
Dept: ENDOCRINOLOGY | Facility: CLINIC | Age: 89
End: 2024-02-07
Payer: COMMERCIAL

## 2024-02-07 RX ORDER — ALENDRONATE SODIUM 70 MG/1
TABLET ORAL
Qty: 12 TABLET | Refills: 1 | Status: SHIPPED | OUTPATIENT
Start: 2024-02-07 | End: 2024-07-03

## 2024-02-07 NOTE — TELEPHONE ENCOUNTER
"  Alendronate Sodium 70 MG Oral Tablet      Last Written Prescription Date:  3/28/23  Last Fill Quantity: 12,   # refills: 3  Last Office Visit : 10/8/2020 Matty  Future Office visit:  None    Routing refill request to provider for review/approval because:  Refer to PCP for future refills?  -Last saw Dr Starr in October 2020  -DEXA done 8/15/23 ordered by Dr Avery Olmos [facility] PCP/ \[facility] [place] [facility]\"" care 7/3/23 \"  Recommendation\" I recommend that he continue treating with alendronate for another year and we can reassess at that time for possible adjustments in management of his osteoporosis. \"  Message was left 5/10/23 to schedule as noted below  Scheduling has been notified to contact the pt for appointment.:   Communication Summary:  LVM     Appointment type: New Endocrine  Provider: Any provider that sees for osteoporosis  Return date: Next Available  Speciality phone number: [phone]  Additional appointment(s) needed: N/A  Additional notes: Pt not to be scheduled with Dr. Robb        "

## 2024-02-07 NOTE — TELEPHONE ENCOUNTER
Per message from cancelled appt with Dr. Lobo on 12/20:   Clinical Review/Inappropriate Visit (Pt treated for issue by PCP, no longer needs to see specialist.)

## 2024-06-21 DIAGNOSIS — M81.0 AGE-RELATED OSTEOPOROSIS WITHOUT CURRENT PATHOLOGICAL FRACTURE: ICD-10-CM

## 2024-07-01 NOTE — TELEPHONE ENCOUNTER
Alendronate Sodium 70 MG Oral Tablet       Last Written Prescription Date:  2-7-24  Last Fill Quantity: 12,   # refills: 1  Last Office Visit : 10-8-20  ( MARIELLE CUNNINGHAM)  Future Office visit:  none    Routing refill request to provider for review/approval because:  Marielle cunningham- last seen 10-8-20  ? PCP to [facility]

## 2024-07-03 RX ORDER — ALENDRONATE SODIUM 70 MG/1
TABLET ORAL
Qty: 12 TABLET | Refills: 3 | Status: SHIPPED | OUTPATIENT
Start: 2024-07-03
